# Patient Record
Sex: FEMALE | Race: BLACK OR AFRICAN AMERICAN | Employment: OTHER | URBAN - METROPOLITAN AREA
[De-identification: names, ages, dates, MRNs, and addresses within clinical notes are randomized per-mention and may not be internally consistent; named-entity substitution may affect disease eponyms.]

---

## 2017-03-04 PROBLEM — H40.1190 COAG (CHRONIC OPEN-ANGLE GLAUCOMA): Status: ACTIVE | Noted: 2017-03-04

## 2017-03-04 PROBLEM — E78.5 DYSLIPIDEMIA: Status: ACTIVE | Noted: 2017-03-04

## 2017-06-07 PROBLEM — Z79.01 ANTICOAGULATED: Status: ACTIVE | Noted: 2017-06-07

## 2017-06-07 PROBLEM — I10 ESSENTIAL HYPERTENSION: Status: ACTIVE | Noted: 2017-06-07

## 2018-05-11 PROCEDURE — 84403 ASSAY OF TOTAL TESTOSTERONE: CPT | Performed by: INTERNAL MEDICINE

## 2018-06-15 PROBLEM — Z51.81 MONITORING FOR LONG-TERM ANTICOAGULANT USE: Status: ACTIVE | Noted: 2018-06-15

## 2018-06-15 PROBLEM — Z79.01 MONITORING FOR LONG-TERM ANTICOAGULANT USE: Status: ACTIVE | Noted: 2018-06-15

## 2018-10-26 PROCEDURE — 86780 TREPONEMA PALLIDUM: CPT | Performed by: INTERNAL MEDICINE

## 2018-10-26 PROCEDURE — 81001 URINALYSIS AUTO W/SCOPE: CPT | Performed by: INTERNAL MEDICINE

## 2018-10-26 PROCEDURE — 87086 URINE CULTURE/COLONY COUNT: CPT | Performed by: INTERNAL MEDICINE

## 2019-01-23 PROBLEM — Z79.01 LONG TERM (CURRENT) USE OF ANTICOAGULANTS: Status: ACTIVE | Noted: 2019-01-23

## 2019-02-13 ENCOUNTER — APPOINTMENT (OUTPATIENT)
Dept: CT IMAGING | Facility: HOSPITAL | Age: 84
DRG: 065 | End: 2019-02-13
Attending: EMERGENCY MEDICINE
Payer: COMMERCIAL

## 2019-02-13 ENCOUNTER — HOSPITAL ENCOUNTER (INPATIENT)
Facility: HOSPITAL | Age: 84
LOS: 4 days | Discharge: NURSING, REHAB OR LONG-TERM CARE | DRG: 065 | End: 2019-02-18
Attending: EMERGENCY MEDICINE | Admitting: HOSPITALIST
Payer: COMMERCIAL

## 2019-02-13 ENCOUNTER — APPOINTMENT (OUTPATIENT)
Dept: GENERAL RADIOLOGY | Facility: HOSPITAL | Age: 84
DRG: 065 | End: 2019-02-13
Attending: EMERGENCY MEDICINE
Payer: COMMERCIAL

## 2019-02-13 DIAGNOSIS — I48.20 CHRONIC A-FIB (HCC): ICD-10-CM

## 2019-02-13 DIAGNOSIS — R26.2 INABILITY TO WALK: Primary | ICD-10-CM

## 2019-02-13 DIAGNOSIS — I48.91 ATRIAL FIBRILLATION, UNSPECIFIED TYPE (HCC): ICD-10-CM

## 2019-02-13 DIAGNOSIS — I10 ESSENTIAL HYPERTENSION: ICD-10-CM

## 2019-02-13 DIAGNOSIS — I48.0 PAROXYSMAL ATRIAL FIBRILLATION (HCC): ICD-10-CM

## 2019-02-13 DIAGNOSIS — M79.89 LEG SWELLING: ICD-10-CM

## 2019-02-13 DIAGNOSIS — I10 BENIGN ESSENTIAL HTN: ICD-10-CM

## 2019-02-13 LAB
ANION GAP SERPL CALC-SCNC: 10 MMOL/L (ref 0–18)
BASOPHILS # BLD AUTO: 0.01 X10(3) UL (ref 0–0.2)
BASOPHILS NFR BLD AUTO: 0.2 %
BILIRUB UR QL: NEGATIVE
BUN BLD-MCNC: 20 MG/DL (ref 7–18)
BUN/CREAT SERPL: 21.3 (ref 10–20)
CALCIUM BLD-MCNC: 9.5 MG/DL (ref 8.5–10.1)
CHLORIDE SERPL-SCNC: 106 MMOL/L (ref 98–107)
CLARITY UR: CLEAR
CO2 SERPL-SCNC: 25 MMOL/L (ref 21–32)
COLOR UR: YELLOW
CREAT BLD-MCNC: 0.94 MG/DL (ref 0.55–1.02)
DEPRECATED RDW RBC AUTO: 48.1 FL (ref 35.1–46.3)
EOSINOPHIL # BLD AUTO: 0.01 X10(3) UL (ref 0–0.7)
EOSINOPHIL NFR BLD AUTO: 0.2 %
ERYTHROCYTE [DISTWIDTH] IN BLOOD BY AUTOMATED COUNT: 14.3 % (ref 11–15)
GLUCOSE BLD-MCNC: 147 MG/DL (ref 70–99)
GLUCOSE BLDC GLUCOMTR-MCNC: 154 MG/DL (ref 70–99)
GLUCOSE UR-MCNC: NEGATIVE MG/DL
HCT VFR BLD AUTO: 39.6 % (ref 35–48)
HGB BLD-MCNC: 12.3 G/DL (ref 12–16)
IMM GRANULOCYTES # BLD AUTO: 0.01 X10(3) UL (ref 0–1)
IMM GRANULOCYTES NFR BLD: 0.2 %
INR BLD: 1.7 (ref 0.9–1.2)
KETONES UR-MCNC: 20 MG/DL
LEUKOCYTE ESTERASE UR QL STRIP.AUTO: NEGATIVE
LYMPHOCYTES # BLD AUTO: 1.24 X10(3) UL (ref 1–4)
LYMPHOCYTES NFR BLD AUTO: 20.6 %
MCH RBC QN AUTO: 28.2 PG (ref 26–34)
MCHC RBC AUTO-ENTMCNC: 31.1 G/DL (ref 31–37)
MCV RBC AUTO: 90.8 FL (ref 80–100)
MONOCYTES # BLD AUTO: 0.33 X10(3) UL (ref 0.1–1)
MONOCYTES NFR BLD AUTO: 5.5 %
NEUTROPHILS # BLD AUTO: 4.42 X10 (3) UL (ref 1.5–7.7)
NEUTROPHILS # BLD AUTO: 4.42 X10(3) UL (ref 1.5–7.7)
NEUTROPHILS NFR BLD AUTO: 73.3 %
NITRITE UR QL STRIP.AUTO: NEGATIVE
OSMOLALITY SERPL CALC.SUM OF ELEC: 297 MOSM/KG (ref 275–295)
PH UR: 5 [PH] (ref 5–8)
PLATELET # BLD AUTO: 224 10(3)UL (ref 150–450)
POTASSIUM SERPL-SCNC: 3.4 MMOL/L (ref 3.5–5.1)
PROT UR-MCNC: 30 MG/DL
PROTHROMBIN TIME: 19.4 SECONDS (ref 11.8–14.5)
RBC # BLD AUTO: 4.36 X10(6)UL (ref 3.8–5.3)
RBC #/AREA URNS AUTO: 4 /HPF
SODIUM SERPL-SCNC: 141 MMOL/L (ref 136–145)
SP GR UR STRIP: 1.03 (ref 1–1.03)
UROBILINOGEN UR STRIP-ACNC: 2
VIT C UR-MCNC: NEGATIVE MG/DL
WBC # BLD AUTO: 6 X10(3) UL (ref 4–11)
WBC #/AREA URNS AUTO: <1 /HPF

## 2019-02-13 PROCEDURE — 70450 CT HEAD/BRAIN W/O DYE: CPT | Performed by: EMERGENCY MEDICINE

## 2019-02-13 PROCEDURE — 71045 X-RAY EXAM CHEST 1 VIEW: CPT | Performed by: EMERGENCY MEDICINE

## 2019-02-13 RX ORDER — ACETAMINOPHEN 325 MG/1
650 TABLET ORAL EVERY 6 HOURS PRN
Status: DISCONTINUED | OUTPATIENT
Start: 2019-02-13 | End: 2019-02-18

## 2019-02-13 RX ORDER — DOCUSATE SODIUM 100 MG/1
100 CAPSULE, LIQUID FILLED ORAL 2 TIMES DAILY
Status: DISCONTINUED | OUTPATIENT
Start: 2019-02-14 | End: 2019-02-18

## 2019-02-13 RX ORDER — METOCLOPRAMIDE HYDROCHLORIDE 5 MG/ML
5 INJECTION INTRAMUSCULAR; INTRAVENOUS EVERY 8 HOURS PRN
Status: DISCONTINUED | OUTPATIENT
Start: 2019-02-13 | End: 2019-02-18

## 2019-02-13 RX ORDER — HEPARIN SODIUM 5000 [USP'U]/ML
5000 INJECTION, SOLUTION INTRAVENOUS; SUBCUTANEOUS EVERY 8 HOURS SCHEDULED
Status: DISCONTINUED | OUTPATIENT
Start: 2019-02-14 | End: 2019-02-14

## 2019-02-13 RX ORDER — POLYETHYLENE GLYCOL 3350 17 G/17G
17 POWDER, FOR SOLUTION ORAL DAILY PRN
Status: DISCONTINUED | OUTPATIENT
Start: 2019-02-13 | End: 2019-02-18

## 2019-02-13 RX ORDER — BISACODYL 10 MG
10 SUPPOSITORY, RECTAL RECTAL
Status: DISCONTINUED | OUTPATIENT
Start: 2019-02-13 | End: 2019-02-18

## 2019-02-13 RX ORDER — SODIUM PHOSPHATE, DIBASIC AND SODIUM PHOSPHATE, MONOBASIC 7; 19 G/133ML; G/133ML
1 ENEMA RECTAL ONCE AS NEEDED
Status: DISCONTINUED | OUTPATIENT
Start: 2019-02-13 | End: 2019-02-18

## 2019-02-13 RX ORDER — SODIUM CHLORIDE 9 MG/ML
INJECTION, SOLUTION INTRAVENOUS CONTINUOUS
Status: DISCONTINUED | OUTPATIENT
Start: 2019-02-14 | End: 2019-02-15

## 2019-02-13 RX ORDER — ACETAMINOPHEN 500 MG
1000 TABLET ORAL ONCE
Status: COMPLETED | OUTPATIENT
Start: 2019-02-13 | End: 2019-02-13

## 2019-02-13 RX ORDER — SODIUM CHLORIDE 0.9 % (FLUSH) 0.9 %
3 SYRINGE (ML) INJECTION AS NEEDED
Status: DISCONTINUED | OUTPATIENT
Start: 2019-02-13 | End: 2019-02-18

## 2019-02-13 RX ORDER — POTASSIUM CHLORIDE 20 MEQ/1
40 TABLET, EXTENDED RELEASE ORAL EVERY 4 HOURS
Status: COMPLETED | OUTPATIENT
Start: 2019-02-14 | End: 2019-02-14

## 2019-02-13 RX ORDER — ONDANSETRON 2 MG/ML
4 INJECTION INTRAMUSCULAR; INTRAVENOUS EVERY 6 HOURS PRN
Status: DISCONTINUED | OUTPATIENT
Start: 2019-02-13 | End: 2019-02-18

## 2019-02-14 ENCOUNTER — APPOINTMENT (OUTPATIENT)
Dept: MRI IMAGING | Facility: HOSPITAL | Age: 84
DRG: 065 | End: 2019-02-14
Attending: HOSPITALIST
Payer: COMMERCIAL

## 2019-02-14 ENCOUNTER — APPOINTMENT (OUTPATIENT)
Dept: ULTRASOUND IMAGING | Facility: HOSPITAL | Age: 84
DRG: 065 | End: 2019-02-14
Attending: HOSPITALIST
Payer: COMMERCIAL

## 2019-02-14 PROBLEM — I63.9 CVA (CEREBRAL VASCULAR ACCIDENT) (HCC): Status: ACTIVE | Noted: 2019-02-14

## 2019-02-14 LAB
INR BLD: 1.8 (ref 0.9–1.2)
POTASSIUM SERPL-SCNC: 3.9 MMOL/L (ref 3.5–5.1)
PROTHROMBIN TIME: 20.5 SECONDS (ref 11.8–14.5)

## 2019-02-14 PROCEDURE — 99223 1ST HOSP IP/OBS HIGH 75: CPT | Performed by: OTHER

## 2019-02-14 PROCEDURE — 93880 EXTRACRANIAL BILAT STUDY: CPT | Performed by: HOSPITALIST

## 2019-02-14 RX ORDER — CITALOPRAM 20 MG/1
10 TABLET ORAL DAILY
Status: DISCONTINUED | OUTPATIENT
Start: 2019-02-14 | End: 2019-02-15

## 2019-02-14 RX ORDER — ENOXAPARIN SODIUM 150 MG/ML
1 INJECTION SUBCUTANEOUS EVERY 12 HOURS SCHEDULED
Status: DISCONTINUED | OUTPATIENT
Start: 2019-02-14 | End: 2019-02-16

## 2019-02-14 RX ORDER — NYSTATIN 100000 U/G
OINTMENT TOPICAL 2 TIMES DAILY
Status: DISCONTINUED | OUTPATIENT
Start: 2019-02-14 | End: 2019-02-18

## 2019-02-14 RX ORDER — WARFARIN SODIUM 4 MG/1
4 TABLET ORAL NIGHTLY
Status: DISCONTINUED | OUTPATIENT
Start: 2019-02-14 | End: 2019-02-16

## 2019-02-14 RX ORDER — DILTIAZEM HYDROCHLORIDE 180 MG/1
180 CAPSULE, EXTENDED RELEASE ORAL DAILY
Status: DISCONTINUED | OUTPATIENT
Start: 2019-02-14 | End: 2019-02-18

## 2019-02-14 RX ORDER — LATANOPROST 50 UG/ML
1 SOLUTION/ DROPS OPHTHALMIC NIGHTLY
Status: DISCONTINUED | OUTPATIENT
Start: 2019-02-14 | End: 2019-02-18

## 2019-02-14 NOTE — CONSULTS
Neurology Inpatient Consult Note    Bradley Gudino : 1934   Referring Physician: Dr. Merlyn Montoya  HPI:     Bradley Gudino is a 80year old female who is being seen in neurologic evaluation.     Patient presented to ED left sided weakness, altered me Alcohol use: No        Alcohol/week: 0.0 oz      Drug use: No        ROS:   GENERAL: no fevers, no chills  SKIN: no new lesions or rashes  HEENT: See HPI  RESPIRATORY: no shortness of breath, no wheezing  CARDIOVASCULAR: no chest pain, no palpitations  GI: cavity size is normal. Wall thickness is normal.     Systolic function is at the lower limits of normal. The estimated     ejection fraction is 50-55%. Wall motion is normal; there are no regional     wall motion abnormalities. 3. Mitral valve:  There is m

## 2019-02-14 NOTE — H&P
[facility] Hospitalist H&P     CC: Patient presents with:  Stroke (neurologic)       PCP: Sal Alcaraz MD      Assessment and Plan     Bradley Gudino is a 80year old female with PMH sig for afib with prior CVA, dementia, anxiety, glaucoma, HTN, urge inco CVA, dementia, anxiety, glaucoma, HTN, urge incontinence who presented left facial droop, left leg weakness and confusion. Symptoms improved but then was unable to ambulate on evaluation. On exam also noted some mild expressive aphasia.       She denies a oz       Fam Hx  Family History   Problem Relation Age of Onset   • Cancer Father         prostate   • Other (Other) Mother         TB   • Other (Other) Sister         sickle cell anemia   • Heart Disorder Son            Objective     Temp: 98 °F (36.7 °C) CEREBRUM: No edema, hemorrhage, mass, acute infarction, or significant atrophy. Small chronic lacunar infarct in the left cerebellar hemisphere inferiorly.  WHITE MATTER: Mild chronic appearing deep white matter ischemic change in the periventricular white 19:52

## 2019-02-14 NOTE — PLAN OF CARE
Problem: Patient Centered Care  Goal: Patient preferences are identified and integrated in the patient's plan of care  Interventions:  - What would you like us to know as we care for you?  Pt lives with son, Pleasant Cheadle who works during the day, pt is mostly home intact  INTERVENTIONS  - Assess and document risk factors for pressure ulcer development  - Assess and document skin integrity  - Monitor for areas of redness and/or skin breakdown  - Initiate interventions, skin care algorithm/standards of care as needed

## 2019-02-14 NOTE — PROGRESS NOTES
[facility] Note:  Renal Dose Adjustment for Metoclopramide (REGLAN)    Jordana Lake has been prescribed Metoclopramide (REGLAN) 10 mg every 8 hours as needed for Nausea, vomiting.     Estimated Creatinine Clearance: 37.8 mL/min (based on SCr of 0.94 mg/dL

## 2019-02-14 NOTE — ED NOTES
Orders for admission, patient is aware of plan and ready to go upstairs.  Any questions, please call ED BRENNAN Martínez  at extension [phone] [facility]

## 2019-02-14 NOTE — ED INITIAL ASSESSMENT (HPI)
Patient from home, alone. Family noted left facial droop and slurred speech. Last known well 1330. Patient normally A&Ox4 and ambulates independently.

## 2019-02-14 NOTE — CM/SW NOTE
SW received MDO to [facility] for home needs, social support and ability to obtain medications. SW spoke w/ pt's daughter, Kelli Tsai for initial assessment and to discuss eventual discharge plans. Pt lives at home w/ her son, Trevor Medina in [facility].  Pt's dtr, Ad

## 2019-02-14 NOTE — ED PROVIDER NOTES
Patient Seen in: [facility] AND [facility] Emergency Department    History   Patient presents with:  Stroke (neurologic)    Stated Complaint:     HPI  History is provided by EMS and patient.     19-year-old female with history of hypertension, obesity, here with and frequency. Musculoskeletal: Negative for back pain. Skin: Negative for rash. Neurological: Negative for weakness, light-headedness and headaches. All other systems reviewed and are negative.       Positive for stated complaint:   Other systems a Atrial Fibrillation  Reading: normal for rate, abnormal for rhythm, ST depressions in V5/6      Cardiac Monitor:    Patient placed on the cardiac monitor and a rhythm strip obtained with the indication of weakness.   Monitor shows afib at a rate of 95 bpm. 4. 42 1.50 - 7.70 x10 (3) uL    Neutrophil Absolute 4.42 1.50 - 7.70 x10(3) uL    Lymphocyte Absolute 1.24 1.00 - 4.00 x10(3) uL    Monocyte Absolute 0.33 0.10 - 1.00 x10(3) uL    Eosinophil Absolute 0.01 0.00 - 0.70 x10(3) uL    Basophil Absolute 0.01 0.00 Xr Chest Ap Portable  (cpt=71045)    Result Date: 2/13/2019  CONCLUSION:  1. Mild cardiomegaly and mildly prominent pulmonary vessels, but no brandon pulmonary edema. No acute airspace disease.      Dictated by (CST): Waqar Nevarez MD on 2/13/2019 at 19: term (current) use of anticoagulants [Z79.01]; and Inability to walk on their problem list. to contribute to the complexity of his ED evaluation.       EMERGENCY DEPARTMENT MEDICAL DECISION MAKING:  After obtaining the patient's history, performing the phys

## 2019-02-14 NOTE — OCCUPATIONAL THERAPY NOTE
OCCUPATIONAL THERAPY EVALUATION - INPATIENT     Room Number: 524/524-A  Evaluation Date: 2/14/2019  Type of Evaluation: Initial  Presenting Problem: (inability to walk)    Physician Order: IP Consult to Occupational Therapy  Reason for Therapy: ADL/IADL Dy educated them on PAULIE recommendation and what to expect. Family and patient verbalized understanding. General Observations: Patient requires additional time to respond to 1 step commands, additional time to complete functional transfers and mobility.  Pa Procedure Laterality Date   • CHOLECYSTECTOMY     • COLONOSCOPY  2008   • D & C     • OTHER SURGICAL HISTORY  9/6/13    cysto-Dr. Persaud [facility]       HOME SITUATION  Type of Home: House  Home Layout: One level  Lives With: Son(son gone for days at a time for work) questions, at times questionable responses    Behavioral/Emotional/Social: Pt is pleasant and cooperative     RANGE OF MOTION   Upper extremity ROM is within functional limits     STRENGTH ASSESSMENT  Upper extremity strength is within functional limits Patient will tolerate standing for 2-3 minutes in prep for adls with spv   Comment:    Patient will complete item retrieval with spv  Comment:          Goals  on: 19  Frequency: 3-5x week    Sondra Kawasaki OTR/L  [facility] AND [facility]

## 2019-02-14 NOTE — PHYSICAL THERAPY NOTE
PHYSICAL THERAPY EVALUATION - INPATIENT     Room Number: 524/524-A  Evaluation Date: 2/14/2019  Type of Evaluation: Initial   Physician Order: PT Eval and Treat    Presenting Problem: Inability to ambulate  Reason for Therapy: Mobility Dysfunction and Dis with this level of impairment may benefit from sub-acute rehab to optimize functional outcomes. Patient will benefit from continued IP PT services to address these deficits in preparation for discharge.     DISCHARGE RECOMMENDATIONS  PT Discharge Recomme Level of Fries: Per pt report, she lives at home with her son whom is not home for days at a time since he is a . She has a daughter that comes and checks in on her daily.  She was ambulating in home with cane when needed, able to toilet Impairment: 64.91%   Standardized Score (AM-PAC Scale): 36.74   CMS Modifier (G-Code): CL    FUNCTIONAL ABILITY STATUS  Gait Assessment   Gait Assistance:  Moderate assistance(assist x 2)  Distance (ft): 25ft; 10ft  Assistive Device: Rolling walker  Pattern

## 2019-02-15 ENCOUNTER — APPOINTMENT (OUTPATIENT)
Dept: MRI IMAGING | Facility: HOSPITAL | Age: 84
DRG: 065 | End: 2019-02-15
Attending: HOSPITALIST
Payer: COMMERCIAL

## 2019-02-15 PROCEDURE — 99232 SBSQ HOSP IP/OBS MODERATE 35: CPT | Performed by: OTHER

## 2019-02-15 PROCEDURE — 70551 MRI BRAIN STEM W/O DYE: CPT | Performed by: HOSPITALIST

## 2019-02-15 PROCEDURE — 90792 PSYCH DIAG EVAL W/MED SRVCS: CPT | Performed by: OTHER

## 2019-02-15 RX ORDER — CITALOPRAM 20 MG/1
20 TABLET ORAL NIGHTLY
Status: DISCONTINUED | OUTPATIENT
Start: 2019-02-16 | End: 2019-02-18

## 2019-02-15 NOTE — CM/SW NOTE
Addend 408p:     Pt's dtr requested referrals to 1)[facility], 2)[facility], 3)[facility], 4)[facility].  Referrals sent.     ----------------------------------------------------------------------------  JESSICA pearson w/ pt and pt's daughter, Nicole Smith to follow up on SN

## 2019-02-15 NOTE — PROGRESS NOTES
[facility] Hospitalist Progress Note     CC: Hospital Follow up    PCP: Brannon Thompson MD       Assessment/Plan:     Principal Problem:    Inability to walk  Active Problems:    CVA (cerebral vascular accident) [facility])    Bobbi Reece is a 80year old female 5' 4\" (1.626 m), weight 237 lb 9.6 oz (107.8 kg), SpO2 99 %, not currently breastfeeding.     Temp:  [97.8 °F (36.6 °C)-97.9 °F (36.6 °C)] 97.8 °F (36.6 °C)  Pulse:  [] 88  Resp:  [16-18] 16  BP: (140-164)/(62-81) 140/81      Intake/Output:    Intake 2/13/2019 at 19:00     Approved by (CST): Jose Gurrola MD on 2/13/2019 at 19:04          Mri Brain (cpt=70551)    Result Date: 2/15/2019  CONCLUSION:  1.  Multiple foci of abnormal diffusion restriction in the left and right cerebellar hemispheres compat Topical BID   • docusate sodium  100 mg Oral BID       Normal Saline Flush, acetaminophen, PEG 3350, magnesium hydroxide, bisacodyl, FLEET ENEMA, ondansetron HCl, Metoclopramide HCl

## 2019-02-15 NOTE — PAYOR COMM NOTE
--------------  ADMISSION REVIEW     Payor: Janie Santiago #:  [id number]  Authorization Number: [id number]    Admit date: 2/14/19  Admit time: 36     Admitting Physician: Jose Stroud DO  Attending Physician:  Madalyn Manning MD  Primary Care Physician fever.   HENT: Negative for congestion, ear pain and sore throat. Eyes: Negative for pain, discharge and redness. Respiratory: Negative for cough, shortness of breath and wheezing. Cardiovascular: Negative for chest pain.    Gastrointestinal: Segundo Scruggs normal finger to nose b/l, no facial asymmetry, slowed although not slurred speech  Skin: Skin is warm and dry. No rash noted. She is not diaphoretic. Psychiatric: She has a normal mood and affect. Nursing note and vitals reviewed.     ED Course     EKG 6.0 4.0 - 11.0 x10(3) uL    RBC 4.36 3.80 - 5.30 x10(6)uL    HGB 12.3 12.0 - 16.0 g/dL    HCT 39.6 35.0 - 48.0 %    MCV 90.8 80.0 - 100.0 fL    MCH 28.2 26.0 - 34.0 pg    MCHC 31.1 31.0 - 37.0 g/dL    RDW-SD 48.1 (H) 35.1 - 46.3 fL    RDW 14.3 11.0 - 15.0 a small chronic lacunar infarct in the left cerebellum inferiorly. There is mild chronic appearing deep white matter ischemic change in the periventricular white matter bilaterally.            Xr Chest Ap Portable  (cpt=71045)  Result Date: 2/13/2019  CONCL Coumadin; Monitoring for long-term anticoagulant use; Long term (current) use of anticoagulants [Z79.01]; and Inability to walk on their problem list. to contribute to the complexity of his ED evaluation.     EMERGENCY DEPARTMENT MEDICAL DECISION MAKING:  A instead of BID as HR in 60's  -hold metoprolol 50 XL  -tele  -hold lasix and lisinopril as it appears from med rec not taking recently  -also per RN no clonidine patch on patient so hold for now.    -per chart review taking 2 mg of coumadin except 4 mg two name but says words incorrectly or words that sound close to but are not the word she wants   Neck Supple, no JVD  Pulm: Lungs clear, normal respiratory effort, No wheezing or crackles  CV: Heart with regular rate and rhythm, No murmurs, rubs, gallops  Abd (CeleXA) tab 10 mg     Date Action Dose Route User    2/15/2019 0923 Given 10 mg Oral Oleg Trinh RN      diltiazem (CARDIZEM CD) 24 hr cap 180 mg     Date Action Dose Route User    2/15/2019 0540 Given 180 mg Oral Thais Carroll RN      docusate so

## 2019-02-15 NOTE — PLAN OF CARE
Pt's daughter, Natalio Valle at bedside, helped in answering some of admit questions/history. Her daughter stated that pt is taking care of her own meds at home.

## 2019-02-15 NOTE — PROGRESS NOTES
Neurology Inpatient Follow-up Note      HPI:     Patient being seen in follow-up. Family members at bedside. Patient feeling well without complaints.       Past Medical Hisotory:  Reviewed    Medications:  Reviewed    Allergies:    Shellfish-Derived P* white matter ischemic changes, likely related to long-standing hypertension and/or diabetes. Old lacunar infarct in the posterior-inferior aspect of the left cerebellar hemisphere.     ASSESSMENT/PLAN:   Acute ischemic strokes  Multifocal ischemia most con

## 2019-02-15 NOTE — PLAN OF CARE
Problem: Patient Centered Care  Goal: Patient preferences are identified and integrated in the patient's plan of care  Interventions:  - What would you like us to know as we care for you?  Pt lives with son, Morgan Payne who works during the day, pt is mostly home and/or skin breakdown  - Initiate interventions, skin care algorithm/standards of care as needed  Outcome: Progressing      Problem: SAFETY ADULT - FALL  Goal: Free from fall injury  INTERVENTIONS:  - Assess pt frequently for physical needs  - Identify cog

## 2019-02-15 NOTE — PHYSICAL THERAPY NOTE
PHYSICAL THERAPY TREATMENT NOTE - INPATIENT     Room Number: 524/524-A       Presenting Problem: Inability to ambulate    Problem List  Principal Problem:    Inability to walk  Active Problems:    CVA (cerebral vascular accident) ([facility] [address]      PHYSICAL THERAP level of impairment may benefit from PAULIE. D/c plans pending progress with therapy and further acute management . SW to work with pt and family on optimal d/c plan. See vitals below , pt with elevated BP.      DISCHARGE RECOMMENDATIONS  PT Discharge Scale): 35.33   CMS Modifier (G-Code): CL    FUNCTIONAL ABILITY STATUS  Gait Assessment   Gait Assistance:  Moderate assistance(2 A used for ambulation close chair follow used  second pers)  Distance (ft): 15 ft x 1  pt declined any further ambulation   Ass

## 2019-02-15 NOTE — PLAN OF CARE
Problem: Patient Centered Care  Goal: Patient preferences are identified and integrated in the patient's plan of care  Interventions:  - What would you like us to know as we care for you?  Pt lives with son, Marcos Fermin who works during the day, pt is mostly home for pressure ulcer development  - Assess and document skin integrity  - Monitor for areas of redness and/or skin breakdown  - Initiate interventions, skin care algorithm/standards of care as needed  Outcome: Progressing  incont care, nystatin cream applied ADULT  Goal: Achieves stable or improved neurological status  INTERVENTIONS  - Assess for and report changes in neurological status  - Initiate measures to prevent increased intracranial pressure  - Maintain blood pressure and fluid volume within ordered p

## 2019-02-15 NOTE — SLP NOTE
SPEECH/LANGUAGE/COGNITIVE EVALUATION - INPATIENT    Admission Date: 2/13/2019  Evaluation Date: 02/15/19    Reason for Referral: Stroke protocol    ASSESSMENT & PLAN   ASSESSMENT & IMPRESSION  RN reports pt with increased response time; however, communicat possibility of embolic disease. There is a   tiny focus of diffusion restriction in the posterior aspect the right parietal lobe most likely representing an additional site of acute/recent infarct.   2. There are mild microvascular white matter ischemic ch

## 2019-02-16 LAB
ANION GAP SERPL CALC-SCNC: 7 MMOL/L (ref 0–18)
BASOPHILS # BLD AUTO: 0.02 X10(3) UL (ref 0–0.2)
BASOPHILS NFR BLD AUTO: 0.5 %
BUN BLD-MCNC: 8 MG/DL (ref 7–18)
BUN/CREAT SERPL: 10.8 (ref 10–20)
CALCIUM BLD-MCNC: 9.1 MG/DL (ref 8.5–10.1)
CHLORIDE SERPL-SCNC: 107 MMOL/L (ref 98–107)
CO2 SERPL-SCNC: 28 MMOL/L (ref 21–32)
CREAT BLD-MCNC: 0.74 MG/DL (ref 0.55–1.02)
DEPRECATED RDW RBC AUTO: 47.7 FL (ref 35.1–46.3)
EOSINOPHIL # BLD AUTO: 0.05 X10(3) UL (ref 0–0.7)
EOSINOPHIL NFR BLD AUTO: 1.2 %
ERYTHROCYTE [DISTWIDTH] IN BLOOD BY AUTOMATED COUNT: 14.5 % (ref 11–15)
GLUCOSE BLD-MCNC: 89 MG/DL (ref 70–99)
HCT VFR BLD AUTO: 38 % (ref 35–48)
HGB BLD-MCNC: 12 G/DL (ref 12–16)
IMM GRANULOCYTES # BLD AUTO: 0.01 X10(3) UL (ref 0–1)
IMM GRANULOCYTES NFR BLD: 0.2 %
INR BLD: 2 (ref 0.9–1.2)
LYMPHOCYTES # BLD AUTO: 1.37 X10(3) UL (ref 1–4)
LYMPHOCYTES NFR BLD AUTO: 32.9 %
MCH RBC QN AUTO: 28.4 PG (ref 26–34)
MCHC RBC AUTO-ENTMCNC: 31.6 G/DL (ref 31–37)
MCV RBC AUTO: 90 FL (ref 80–100)
MONOCYTES # BLD AUTO: 0.36 X10(3) UL (ref 0.1–1)
MONOCYTES NFR BLD AUTO: 8.6 %
NEUTROPHILS # BLD AUTO: 2.36 X10 (3) UL (ref 1.5–7.7)
NEUTROPHILS # BLD AUTO: 2.36 X10(3) UL (ref 1.5–7.7)
NEUTROPHILS NFR BLD AUTO: 56.6 %
OSMOLALITY SERPL CALC.SUM OF ELEC: 292 MOSM/KG (ref 275–295)
PLATELET # BLD AUTO: 204 10(3)UL (ref 150–450)
POTASSIUM SERPL-SCNC: 3.8 MMOL/L (ref 3.5–5.1)
PROTHROMBIN TIME: 21.8 SECONDS (ref 11.8–14.5)
RBC # BLD AUTO: 4.22 X10(6)UL (ref 3.8–5.3)
SODIUM SERPL-SCNC: 142 MMOL/L (ref 136–145)
WBC # BLD AUTO: 4.2 X10(3) UL (ref 4–11)

## 2019-02-16 RX ORDER — POTASSIUM CHLORIDE 20 MEQ/1
40 TABLET, EXTENDED RELEASE ORAL ONCE
Status: COMPLETED | OUTPATIENT
Start: 2019-02-16 | End: 2019-02-16

## 2019-02-16 RX ORDER — WARFARIN SODIUM 5 MG/1
5 TABLET ORAL NIGHTLY
Status: DISCONTINUED | OUTPATIENT
Start: 2019-02-16 | End: 2019-02-18

## 2019-02-16 NOTE — PLAN OF CARE
Problem: Patient/Family Goals  Goal: Patient/Family Long Term Goal  Patient's Long Term Goal: To return home    Interventions:  - follow md orders  -monitor labs  -administer meds  - See additional Care Plan goals for specific interventions   Outcome: Prog strengthening/mobility  - Encourage toileting schedule  Outcome: Progressing      Problem: DISCHARGE PLANNING  Goal: Discharge to home or other facility with appropriate resources  INTERVENTIONS:  - Identify barriers to discharge w/pt and caregiver  - Incl

## 2019-02-16 NOTE — CONSULTS
[facility] HOSP - [facility]    Report of Consultation    [place] Gulf Patient Status:  Inpatient    1934 MRN [id number]   Location [facility]5W Attending Marilee Alanis MD   Hosp Day # 1 PCP Rickey Champion MD     Date of Admission:   patient reported that she do her best to be compliant with medication and she name medication by the function such taken blood thinner and taken medicine for blood pressure.     According to the son there is no clarity indicate that the family has been help Alcohol/week: 0.0 oz    Drug use: No          Current Medications:    Current Facility-Administered Medications:  [START ON 2/16/2019] Citalopram Hydrobromide (CeleXA) tab 20 mg 20 mg Oral Nightly   Enoxaparin Sodium (LOVENOX) 120 MG/0.8ML injection 110 mg ERGOCALCIFEROL 66563 units Oral Cap TAKE ONE CAPSULE BY MOUTH ONCE A WEEK       Allergies    Shellfish-Derived P*    SWELLING    Comment:Swelling of the mouth      Review of Systems:     As by Admitting/Attending    Results:     Laboratory Data:  Lab Res Mami Banerjee MD on 2/15/2019 at 12:50     Approved by (CST): John Frank MD on 2/15/2019 at 12:59          [facility] (c[facility]38[facility])    Result Date: 2/14/2019  CONCLUSION:  1.  Bilateral atherosclerotic changes with no hemodynamically signi MRI.  Symptom has been resolved. Otherwise the patient seem had more anxiety aside from her CVA need to be address. At this point, I would recommend the following approach:     1. Focus on education and support.   2. Psychotherapy focusing on insight fozia

## 2019-02-16 NOTE — PHYSICAL THERAPY NOTE
PHYSICAL THERAPY TREATMENT NOTE - INPATIENT     Room Number: 524/524-A       Presenting Problem: Inability to ambulate    Problem List  Principal Problem:    Inability to walk  Active Problems:    CVA (cerebral vascular accident) ([facility] [address]    Generalized anxie mobility; Body mechanics; Endurance; Energy conservation;Patient education; Family education;Strengthening;Balance training;Transfer training    SUBJECTIVE  \"I'm doing better, I can take care of myself\"    OBJECTIVE  Precautions: Bed/chair alarm;Cardiac    W to be met by: 2/28/19  Patient Goal Patient's self-stated goal is: Return to PLOF   Goal #1 Patient is able to demonstrate supine - sit EOB @ level: supervision      Goal #1   Current Status  NT this session   Goal #2 Patient is able to demonstrate transfe

## 2019-02-16 NOTE — OCCUPATIONAL THERAPY NOTE
OCCUPATIONAL THERAPY TREATMENT NOTE - INPATIENT        Room Number: 524/524-A           Presenting Problem: (inability to walk)    Problem List  Principal Problem:    Inability to walk  Active Problems:    CVA (cerebral vascular accident) ([facility]    Anne Padilla RESTRICTION  Weight Bearing Restriction: None                PAIN ASSESSMENT  Ratin           ACTIVITY TOLERANCE  Pulse: 118  Heart Rate Source: Monitor                   O2 SATURATIONS  SPO2 on Room Air at Rest: 100             ACTIVITIES OF DAILY KOKO

## 2019-02-16 NOTE — PROGRESS NOTES
[facility] Hospitalist Progress Note     CC: Hospital Follow up    PCP: Tammy Christensen MD       Assessment/Plan:     Principal Problem:    Inability to walk  Active Problems:    CVA (cerebral vascular accident) [facility])    Generalized anxiety disorder    Nichelle weakness    OBJECTIVE:    Blood pressure 122/53, pulse 102, temperature 98 °F (36.7 °C), temperature source Oral, resp. rate 20, height 5' 4\" (1.626 m), weight 237 lb 9.6 oz (107.8 kg), SpO2 97 %, not currently breastfeeding.     Temp:  [98 °F (36.7 °C)-99 focal infarct or mass. There is a small chronic lacunar infarct in the left cerebellum inferiorly. There is mild chronic appearing deep white matter ischemic change in the periventricular white matter bilaterally.      Dictated by (CST): Jed Santiago MD o Sodium  5 mg Oral Nightly   • Citalopram Hydrobromide  20 mg Oral Nightly   • DilTIAZem HCl ER Coated Beads  180 mg Oral Daily   • latanoprost  1 drop Both Eyes Nightly   • nystatin   Topical BID   • docusate sodium  100 mg Oral BID       Normal Saline Flu

## 2019-02-16 NOTE — PLAN OF CARE
Problem: Patient Centered Care  Goal: Patient preferences are identified and integrated in the patient's plan of care  Interventions:  - What would you like us to know as we care for you?  Pt lives with son, Guera Gleason who works during the day, pt is mostly home and/or skin breakdown  - Initiate interventions, skin care algorithm/standards of care as needed  Outcome: Progressing      Problem: SAFETY ADULT - FALL  Goal: Free from fall injury  INTERVENTIONS:  - Assess pt frequently for physical needs  - Identify cog minimize risk of hemorrhage  - Monitor temperature, glucose, and sodium. Initiate appropriate interventions as ordered  Outcome: Progressing      Comments: Patient is alert and oriented. Periods of confusion at times.  No hallucinations noted during day jay

## 2019-02-17 LAB
INR BLD: 2.1 (ref 0.9–1.2)
POTASSIUM SERPL-SCNC: 4 MMOL/L (ref 3.5–5.1)
PROTHROMBIN TIME: 23.4 SECONDS (ref 11.8–14.5)

## 2019-02-17 PROCEDURE — 99233 SBSQ HOSP IP/OBS HIGH 50: CPT | Performed by: OTHER

## 2019-02-18 VITALS
SYSTOLIC BLOOD PRESSURE: 122 MMHG | TEMPERATURE: 98 F | WEIGHT: 237.63 LBS | HEART RATE: 62 BPM | RESPIRATION RATE: 20 BRPM | OXYGEN SATURATION: 100 % | HEIGHT: 64 IN | DIASTOLIC BLOOD PRESSURE: 58 MMHG | BODY MASS INDEX: 40.57 KG/M2

## 2019-02-18 LAB
INR BLD: 2.1 (ref 0.9–1.2)
PROTHROMBIN TIME: 22.9 SECONDS (ref 11.8–14.5)

## 2019-02-18 RX ORDER — METOPROLOL SUCCINATE 50 MG/1
50 TABLET, EXTENDED RELEASE ORAL DAILY
Status: DISCONTINUED | OUTPATIENT
Start: 2019-02-18 | End: 2019-02-18

## 2019-02-18 RX ORDER — WARFARIN SODIUM 5 MG/1
5 TABLET ORAL NIGHTLY
Qty: 30 TABLET | Refills: 0 | Status: SHIPPED | OUTPATIENT
Start: 2019-02-18 | End: 2019-03-07

## 2019-02-18 RX ORDER — FUROSEMIDE 20 MG/1
20 TABLET ORAL DAILY
Qty: 30 TABLET | Refills: 0 | Status: SHIPPED | OUTPATIENT
Start: 2019-02-19 | End: 2019-03-07

## 2019-02-18 NOTE — PLAN OF CARE
Problem: Patient Centered Care  Goal: Patient preferences are identified and integrated in the patient's plan of care  Interventions:  - What would you like us to know as we care for you?  Pt lives with son, Shawn Casanova who works during the day, pt is mostly home integrity  - Monitor for areas of redness and/or skin breakdown  - Initiate interventions, skin care algorithm/standards of care as needed  Outcome: Adequate for Discharge      Problem: SAFETY ADULT - FALL  Goal: Free from fall injury  INTERVENTIONS:  - As and fluid volume within ordered parameters to optimize cerebral perfusion and minimize risk of hemorrhage  - Monitor temperature, glucose, and sodium.  Initiate appropriate interventions as ordered  Outcome: Adequate for Discharge      Problem: Diabetes/Glu

## 2019-02-18 NOTE — PROGRESS NOTES
Daughter at the bedside, [facility], report called to [email or web address].

## 2019-02-18 NOTE — PLAN OF CARE
Problem: Patient/Family Goals  Goal: Patient/Family Short Term Goal  Patient's Short Term Goal: To be able to walk again    Interventions:   - neuro consult  -pt/ot   -enc bed mobility  -assist pt w/ adl's  -fall prec    - See additional Care Plan goals fo Include patient/family/discharge partner in discharge planning  - Arrange for needed discharge resources and transportation as appropriate  - Identify discharge learning needs (meds, wound care, etc)  - Arrange for interpreters to assist at discharge as ne

## 2019-02-18 NOTE — PHYSICAL THERAPY NOTE
PHYSICAL THERAPY TREATMENT NOTE - INPATIENT     Room Number: 524/524-A       Presenting Problem: Inability to ambulate    Problem List  Principal Problem:    Inability to walk  Active Problems:    CVA (cerebral vascular accident) ([facility] [address]    Generalized anxie -  Dynamic Standing: Poor +    AM-PAC '6-Clicks' INPATIENT SHORT FORM - BASIC MOBILITY  How much difficulty does the patient currently have. ..  -   Turning over in bed (including adjusting bedclothes, sheets and blankets)?: A Little   -   Sitting down on a instructions provided to patient in preparation for discharge.    Goal #5   Current Status  in progress

## 2019-02-18 NOTE — PLAN OF CARE
Problem: Patient/Family Goals  Goal: Patient/Family Long Term Goal  Patient's Long Term Goal: To return home    Interventions:  - follow md orders  -monitor labs  -administer meds  - See additional Care Plan goals for specific interventions   Outcome: Prog strengthening/mobility  - Encourage toileting schedule  Outcome: Progressing

## 2019-02-18 NOTE — PROGRESS NOTES
Patient is a 80year old -American female with history of A. fib, prior CVA, anxiety and hypertension who presented to the hospital with left facial drop and left leg weakness and confusion.      Consult Duration     The patient seen for 35 minutes Microscopic hematuria 7/5/2013   • OBESITY    • Obesity    • TIA (transient ischemic attack)     2006   • Tricuspid regurgitation 10/7/2010   • Venous incompetence 4/25/2014   • Visual impairment     uses glasses at home   • Vitamin D deficiency 4/7/2012 (10 mg total) by mouth daily. WARFARIN SODIUM 2 MG Oral Tab TAKE 2 TABLETS BY MOUTH EVERY DAY   lisinopril 40 MG Oral Tab Take 1 tablet (40 mg total) by mouth daily.    DilTIAZem HCl ER Coated Beads (CARTIA XT) 180 MG Oral Capsule SR 24 Hr TAKE ONE CAPSUL microvascular white matter ischemic changes, likely related to long-standing hypertension and/or diabetes. Old lacunar infarct in the posterior-inferior aspect of the left cerebellar hemisphere.   Dictated by (CST): Steve Nicolas MD on 2/15/2019 at 12:50 Prothrombin Time (PT)      Potassium      Prothrombin Time (PT)      Basic Metabolic Panel (8)      CBC With Differential With Platelet      Prothrombin Time (PT)      Potassium      Prothrombin Time (PT)      Prothrombin Time (PT)      Meds This Visit:  R

## 2019-02-18 NOTE — DISCHARGE SUMMARY
General Medicine Discharge Summary     Patient ID:  Gildardo Olson  80year old  2/11/1934    Admit date: 2/13/2019    Discharge date and time: 2/18/19    Attending Physician: Casper Garcia MD     Consults: IP CONSULT TO SOCIAL WORK  IP CONSULT TO NEUROLOGY  IP droop, left leg weakness and confusion.  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  See below for details.       Left sided weakness->sec directions you see here. Warfarin Sodium 5 MG Tabs  Commonly known as:  COUMADIN  Take as directed. If you are unsure how to take this medication, talk to your nurse or doctor. Original instructions: Take 1 tablet (5 mg total) by mouth nightly.   What as outlined    Thank Berna Jewell M.D.  [facility] Hospitalist  [place]

## 2019-02-18 NOTE — CM/SW NOTE
Zeinab from [facility] informed SW they have received insurance authorization and will be ready for pt today at [address] paged MD regarding update. DCSS notified of discharge. RN stated that pt is able to transport via [address].  JESSICA updated pt's daughter, Ad

## 2019-02-18 NOTE — OCCUPATIONAL THERAPY NOTE
OCCUPATIONAL THERAPY TREATMENT NOTE - INPATIENT        Room Number: 524/524-A           Presenting Problem: (inability to walk)    Problem List  Principal Problem:    Inability to walk  Active Problems:    CVA (cerebral vascular accident) ([facility] [address]    Katya Guerrero ACTIVITIES OF DAILY LIVING ASSESSMENT  AM-PAC ‘6-Clicks’ Inpatient Daily Activity Short Form  How much help from another person does the patient currently need…  -   Putting on and taking off regular lower body clothing?: A Lot  -   Bathing (includin

## 2019-02-19 NOTE — PAYOR COMM NOTE
--------------  DISCHARGE REVIEW    Payor: Bindu Rondon #:  [id number]  Authorization Number: [id number]    Admit date: 2/14/19  Admit time:  1433  Discharge Date: 2/18/2019  5:48 PM     Admitting Physician: Tamar Zhao DO  Attending Physician:  Caitlin Connell confusion. Symptoms improved but then was unable to ambulate on evaluation. On exam also noted some mild expressive aphasia.       She denies any focal weakness or numbness, no marked left facial droop noted.   She answers questions but says the wrong wor diet  Wound Care: as directed  Code Status: Full Code  O2: none    Home Medication Changes:     Med list  CHANGE how you take these medications    furosemide 20 MG Tabs  Commonly known as:  LASIX  Take 1 tablet (20 mg total) by mouth daily.  2 tablets daily PEDIATRICS  Contact information:  [address] [facility] [address] [phone]        DC instructions: Other Discharge Instructions:    Please follow INR closely, goal INR 2-3, increased warfarin to 5mg.  INR 2.1 on DC  Please repe

## 2019-02-19 NOTE — PAYOR COMM NOTE
--------------  CONTINUED STAY REVIEW    Payor: Janie Saenz #:  [id number]  Authorization Number: [id number]    Admit date: 2/14/19  Admit time: 36    Admitting Physician: oJyce Tijerina DO  Attending Physician:  No att. providers found  Primary C XL  -tele  -hold lasix and lisinopril as it appears from med rec not taking recently  -also per RN no clonidine patch on patient so hold for now.    -per chart review taking 2 mg of coumadin except 4 mg two days a week, resume at 4 mg for now.    Dementia training;Patient/Family education;Patient/Family training;Equipment eval/education; Compensatory technique education    2/17/19:  Plan:  -INR 2.1, warfarin increased to 5 mg 2/16 and repeat in AM, titrate as needed, lovenox stopped  Afib with prior CVA, HTN REVIEW/APPROVAL OF INPT ADMISSION    PLEASE FAX ALL INPT DAYS AS AUTHORIZED ALONG W/NRD

## 2019-02-20 ENCOUNTER — SNF ADMIT/H&P (OUTPATIENT)
Dept: INTERNAL MEDICINE CLINIC | Facility: SKILLED NURSING FACILITY | Age: 84
End: 2019-02-20

## 2019-02-20 DIAGNOSIS — I10 ESSENTIAL HYPERTENSION: ICD-10-CM

## 2019-02-20 DIAGNOSIS — I48.20 CHRONIC A-FIB (HCC): ICD-10-CM

## 2019-02-20 DIAGNOSIS — I63.9 CEREBROVASCULAR ACCIDENT (CVA), UNSPECIFIED MECHANISM (HCC): ICD-10-CM

## 2019-02-20 PROCEDURE — 99310 SBSQ NF CARE HIGH MDM 45: CPT | Performed by: NURSE PRACTITIONER

## 2019-02-20 NOTE — PROGRESS NOTES
HPI: Kassandra Carter  Is an 81 yo female with PMH sig for afib with prior CVA, dementia, anxiety, glaucoma, HTN, urge incontinence who presented to [address] [place] [address] with left facial droop, left leg weakness and confusion.  Symptoms improved but then was unable to ambulate SYSTEMS:  Denies pain. Denies sob/cough/cp/palpitations. Reports she is having normal BMs. Afebrile.       PHYSICAL EXAM:  GENERAL HEALTH:  NAD  HEENT: atraumatic/normocephalic, mucous membranes pink and moist, PERRLA, EOMI, sclera anicteric, conjunctiva no

## 2019-02-25 ENCOUNTER — SNF VISIT (OUTPATIENT)
Dept: INTERNAL MEDICINE CLINIC | Facility: SKILLED NURSING FACILITY | Age: 84
End: 2019-02-25

## 2019-02-25 DIAGNOSIS — I10 ESSENTIAL HYPERTENSION: ICD-10-CM

## 2019-02-25 DIAGNOSIS — I63.9 CEREBROVASCULAR ACCIDENT (CVA), UNSPECIFIED MECHANISM (HCC): ICD-10-CM

## 2019-02-25 DIAGNOSIS — I48.20 CHRONIC A-FIB (HCC): ICD-10-CM

## 2019-02-25 PROCEDURE — 99308 SBSQ NF CARE LOW MDM 20: CPT | Performed by: NURSE PRACTITIONER

## 2019-02-25 NOTE — PROGRESS NOTES
HPI: Ashley Figueroa  Is an 79 yo female with PMH sig for afib with prior CVA, dementia, anxiety, glaucoma, HTN, urge incontinence who presented to [facility] with left facial droop, left leg weakness and confusion.  Symptoms improved but then was unable to ambulate irregular  ABDOMEN:  normal active BS+, soft, non distended, nontender   MUSCULOSKELETAL/EXTREMITIES: trace BLE edema, elevates extremities equally x 4  SKIN: warm, dry  NEUROLOGIC: follows commands, speech clear   PSYCHIATRIC: a/ox2 to place and time, not

## 2019-02-27 ENCOUNTER — SNF VISIT (OUTPATIENT)
Dept: INTERNAL MEDICINE CLINIC | Facility: SKILLED NURSING FACILITY | Age: 84
End: 2019-02-27

## 2019-02-27 DIAGNOSIS — I10 ESSENTIAL HYPERTENSION: ICD-10-CM

## 2019-02-27 DIAGNOSIS — I63.9 CEREBROVASCULAR ACCIDENT (CVA), UNSPECIFIED MECHANISM (HCC): ICD-10-CM

## 2019-02-27 DIAGNOSIS — R53.1 WEAKNESS: ICD-10-CM

## 2019-02-27 PROCEDURE — 99307 SBSQ NF CARE SF MDM 10: CPT | Performed by: NURSE PRACTITIONER

## 2019-02-27 NOTE — PROGRESS NOTES
HPI: Jordana Forte  Is an 81 yo female with PMH sig for afib with prior CVA, dementia, anxiety, glaucoma, HTN, urge incontinence who presented to [facility] with left facial droop, left leg weakness and confusion.  Symptoms improved but then was unable to ambulate RESPIRATORY: CTAB  CARDIOVASCULAR: S1 S2, rate regular, rhythm irregular  ABDOMEN:  normal active BS+, soft, non distended, nontender   MUSCULOSKELETAL/EXTREMITIES: trace BLE edema, elevates extremities equally x 4  SKIN: warm, dry  NEUROLOGIC: follows c

## 2019-04-11 PROCEDURE — 87086 URINE CULTURE/COLONY COUNT: CPT | Performed by: PHYSICIAN ASSISTANT

## 2019-10-24 PROBLEM — I48.19 PERSISTENT ATRIAL FIBRILLATION (HCC): Status: ACTIVE | Noted: 2019-10-24

## 2020-08-25 PROBLEM — I27.21 SECONDARY PULMONARY ARTERIAL HYPERTENSION (HCC): Status: ACTIVE | Noted: 2020-08-25

## 2020-08-25 PROBLEM — Z86.73 HISTORY OF CVA (CEREBROVASCULAR ACCIDENT): Status: ACTIVE | Noted: 2019-02-14

## 2020-08-25 PROBLEM — I70.0 AORTIC ATHEROSCLEROSIS (HCC): Status: ACTIVE | Noted: 2020-08-25

## 2020-08-25 PROBLEM — D68.69 SECONDARY HYPERCOAGULABLE STATE (HCC): Status: ACTIVE | Noted: 2020-08-25

## 2020-09-04 PROBLEM — I48.21 PERMANENT ATRIAL FIBRILLATION (HCC): Status: ACTIVE | Noted: 2020-09-04

## 2022-12-03 ENCOUNTER — APPOINTMENT (OUTPATIENT)
Dept: CT IMAGING | Facility: HOSPITAL | Age: 87
End: 2022-12-03
Payer: COMMERCIAL

## 2022-12-03 ENCOUNTER — HOSPITAL ENCOUNTER (EMERGENCY)
Facility: HOSPITAL | Age: 87
Discharge: HOME | End: 2022-12-03
Attending: EMERGENCY MEDICINE
Payer: COMMERCIAL

## 2022-12-03 VITALS
TEMPERATURE: 98 F | RESPIRATION RATE: 21 BRPM | DIASTOLIC BLOOD PRESSURE: 62 MMHG | OXYGEN SATURATION: 100 % | HEART RATE: 47 BPM | HEIGHT: 64 IN | BODY MASS INDEX: 37.05 KG/M2 | WEIGHT: 217 LBS | SYSTOLIC BLOOD PRESSURE: 131 MMHG

## 2022-12-03 DIAGNOSIS — S09.90XA INJURY OF HEAD, INITIAL ENCOUNTER: Primary | ICD-10-CM

## 2022-12-03 PROCEDURE — 99284 EMERGENCY DEPT VISIT MOD MDM: CPT

## 2022-12-03 PROCEDURE — 70450 CT HEAD/BRAIN W/O DYE: CPT | Performed by: EMERGENCY MEDICINE

## 2022-12-03 NOTE — ED INITIAL ASSESSMENT (HPI)
Pt to ED via EMS after a mechanical fall from home. Pt reports that she was walking and fell from standing. Pt states she hit her forehead on the corner of a side table. No LOC. On Coumadin. Denies Dizziness or pain.

## 2022-12-03 NOTE — ED QUICK NOTES
Patient safe to DC home per MD. Luanne Jeronimo to dress self. DC teaching done, instructions reviewed with patient including when and how to follow up with healthcare providers and when to seek emergency care. The patient verbalizes understanding. Patient wheeled to exit.

## 2022-12-29 ENCOUNTER — APPOINTMENT (OUTPATIENT)
Dept: CT IMAGING | Facility: HOSPITAL | Age: 87
End: 2022-12-29
Attending: EMERGENCY MEDICINE
Payer: COMMERCIAL

## 2022-12-29 ENCOUNTER — HOSPITAL ENCOUNTER (OUTPATIENT)
Facility: HOSPITAL | Age: 87
Setting detail: OBSERVATION
Discharge: NURSING, REHAB OR LONG-TERM CARE | End: 2023-01-03
Attending: EMERGENCY MEDICINE | Admitting: INTERNAL MEDICINE
Payer: COMMERCIAL

## 2022-12-29 ENCOUNTER — APPOINTMENT (OUTPATIENT)
Dept: MRI IMAGING | Facility: HOSPITAL | Age: 87
End: 2022-12-29
Attending: EMERGENCY MEDICINE
Payer: COMMERCIAL

## 2022-12-29 DIAGNOSIS — S12.01XA CLOSED JEFFERSON FRACTURE, INITIAL ENCOUNTER (HCC): Primary | ICD-10-CM

## 2022-12-29 LAB
ANION GAP SERPL CALC-SCNC: 9 MMOL/L (ref 0–18)
BASOPHILS # BLD AUTO: 0.01 X10(3) UL (ref 0–0.2)
BASOPHILS NFR BLD AUTO: 0.2 %
BUN BLD-MCNC: 13 MG/DL (ref 7–18)
BUN/CREAT SERPL: 13.3 (ref 10–20)
CALCIUM BLD-MCNC: 9.2 MG/DL (ref 8.5–10.1)
CHLORIDE SERPL-SCNC: 106 MMOL/L (ref 98–112)
CO2 SERPL-SCNC: 26 MMOL/L (ref 21–32)
CREAT BLD-MCNC: 0.98 MG/DL
DEPRECATED RDW RBC AUTO: 50.6 FL (ref 35.1–46.3)
EOSINOPHIL # BLD AUTO: 0.05 X10(3) UL (ref 0–0.7)
EOSINOPHIL NFR BLD AUTO: 0.8 %
ERYTHROCYTE [DISTWIDTH] IN BLOOD BY AUTOMATED COUNT: 14.6 % (ref 11–15)
GFR SERPLBLD BASED ON 1.73 SQ M-ARVRAT: 56 ML/MIN/1.73M2 (ref 60–?)
GLUCOSE BLD-MCNC: 95 MG/DL (ref 70–99)
HCT VFR BLD AUTO: 33.4 %
HGB BLD-MCNC: 10.1 G/DL
IMM GRANULOCYTES # BLD AUTO: 0.01 X10(3) UL (ref 0–1)
IMM GRANULOCYTES NFR BLD: 0.2 %
INR BLD: 3.28 (ref 0.85–1.16)
LYMPHOCYTES # BLD AUTO: 0.88 X10(3) UL (ref 1–4)
LYMPHOCYTES NFR BLD AUTO: 13.9 %
MCH RBC QN AUTO: 28.3 PG (ref 26–34)
MCHC RBC AUTO-ENTMCNC: 30.2 G/DL (ref 31–37)
MCV RBC AUTO: 93.6 FL
MONOCYTES # BLD AUTO: 0.71 X10(3) UL (ref 0.1–1)
MONOCYTES NFR BLD AUTO: 11.2 %
NEUTROPHILS # BLD AUTO: 4.69 X10 (3) UL (ref 1.5–7.7)
NEUTROPHILS # BLD AUTO: 4.69 X10(3) UL (ref 1.5–7.7)
NEUTROPHILS NFR BLD AUTO: 73.7 %
OSMOLALITY SERPL CALC.SUM OF ELEC: 292 MOSM/KG (ref 275–295)
PLATELET # BLD AUTO: 206 10(3)UL (ref 150–450)
POTASSIUM SERPL-SCNC: 4.1 MMOL/L (ref 3.5–5.1)
PROTHROMBIN TIME: 32.7 SECONDS (ref 11.6–14.8)
RBC # BLD AUTO: 3.57 X10(6)UL
SARS-COV-2 RNA RESP QL NAA+PROBE: NOT DETECTED
SODIUM SERPL-SCNC: 141 MMOL/L (ref 136–145)
WBC # BLD AUTO: 6.4 X10(3) UL (ref 4–11)

## 2022-12-29 PROCEDURE — 99203 OFFICE O/P NEW LOW 30 MIN: CPT | Performed by: STUDENT IN AN ORGANIZED HEALTH CARE EDUCATION/TRAINING PROGRAM

## 2022-12-29 PROCEDURE — 72141 MRI NECK SPINE W/O DYE: CPT | Performed by: EMERGENCY MEDICINE

## 2022-12-29 PROCEDURE — 72125 CT NECK SPINE W/O DYE: CPT | Performed by: EMERGENCY MEDICINE

## 2022-12-29 PROCEDURE — 70450 CT HEAD/BRAIN W/O DYE: CPT | Performed by: EMERGENCY MEDICINE

## 2022-12-29 RX ORDER — HYDROCODONE BITARTRATE AND ACETAMINOPHEN 5; 325 MG/1; MG/1
1 TABLET ORAL EVERY 4 HOURS PRN
Status: DISCONTINUED | OUTPATIENT
Start: 2022-12-29 | End: 2023-01-03

## 2022-12-29 RX ORDER — DILTIAZEM HYDROCHLORIDE 180 MG/1
180 CAPSULE, EXTENDED RELEASE ORAL DAILY
Status: DISCONTINUED | OUTPATIENT
Start: 2022-12-30 | End: 2023-01-03

## 2022-12-29 RX ORDER — ACETAMINOPHEN 325 MG/1
650 TABLET ORAL EVERY 4 HOURS PRN
Status: DISCONTINUED | OUTPATIENT
Start: 2022-12-29 | End: 2023-01-03

## 2022-12-29 RX ORDER — ALPRAZOLAM 0.5 MG/1
0.25 TABLET ORAL EVERY 8 HOURS PRN
Status: DISCONTINUED | OUTPATIENT
Start: 2022-12-29 | End: 2023-01-03

## 2022-12-29 RX ORDER — LISINOPRIL 40 MG/1
40 TABLET ORAL DAILY
Status: DISCONTINUED | OUTPATIENT
Start: 2022-12-30 | End: 2023-01-03

## 2022-12-29 RX ORDER — METOPROLOL SUCCINATE 50 MG/1
50 TABLET, EXTENDED RELEASE ORAL
Status: DISCONTINUED | OUTPATIENT
Start: 2022-12-30 | End: 2023-01-03

## 2022-12-29 RX ORDER — ONDANSETRON 2 MG/ML
4 INJECTION INTRAMUSCULAR; INTRAVENOUS EVERY 6 HOURS PRN
Status: DISCONTINUED | OUTPATIENT
Start: 2022-12-29 | End: 2023-01-03

## 2022-12-29 RX ORDER — LATANOPROST 50 UG/ML
1 SOLUTION/ DROPS OPHTHALMIC NIGHTLY
Status: DISCONTINUED | OUTPATIENT
Start: 2022-12-29 | End: 2023-01-03

## 2022-12-29 RX ORDER — HYDROCODONE BITARTRATE AND ACETAMINOPHEN 5; 325 MG/1; MG/1
2 TABLET ORAL EVERY 4 HOURS PRN
Status: DISCONTINUED | OUTPATIENT
Start: 2022-12-29 | End: 2023-01-03

## 2022-12-29 NOTE — DISCHARGE INSTRUCTIONS
[place] Cervical Collar Neck Brace can be purchased at:  [facility]   # [phone]    [facility]   [address]

## 2022-12-29 NOTE — ED INITIAL ASSESSMENT (HPI)
Pt to ED via EMS from home c/o headache starting this morning. Pt reports a fall 2 days ago, +head injury to left side of head.

## 2022-12-29 NOTE — CM/SW NOTE
CM added in AVS the 3 resources for pt to obtain a [place] Cervical collar. CM to remain available for support and/or discharge planning.     Ruslan Iglesias RN, [address] Nurse   Ext. [phone]

## 2022-12-30 LAB
ANION GAP SERPL CALC-SCNC: 9 MMOL/L (ref 0–18)
BASOPHILS # BLD AUTO: 0.02 X10(3) UL (ref 0–0.2)
BASOPHILS NFR BLD AUTO: 0.4 %
BUN BLD-MCNC: 11 MG/DL (ref 7–18)
BUN/CREAT SERPL: 12.2 (ref 10–20)
CALCIUM BLD-MCNC: 9 MG/DL (ref 8.5–10.1)
CHLORIDE SERPL-SCNC: 105 MMOL/L (ref 98–112)
CO2 SERPL-SCNC: 27 MMOL/L (ref 21–32)
CREAT BLD-MCNC: 0.9 MG/DL
DEPRECATED RDW RBC AUTO: 48 FL (ref 35.1–46.3)
EOSINOPHIL # BLD AUTO: 0.02 X10(3) UL (ref 0–0.7)
EOSINOPHIL NFR BLD AUTO: 0.4 %
ERYTHROCYTE [DISTWIDTH] IN BLOOD BY AUTOMATED COUNT: 14.2 % (ref 11–15)
GFR SERPLBLD BASED ON 1.73 SQ M-ARVRAT: 61 ML/MIN/1.73M2 (ref 60–?)
GLUCOSE BLD-MCNC: 77 MG/DL (ref 70–99)
HCT VFR BLD AUTO: 32 %
HGB BLD-MCNC: 9.9 G/DL
IMM GRANULOCYTES # BLD AUTO: 0.02 X10(3) UL (ref 0–1)
IMM GRANULOCYTES NFR BLD: 0.4 %
INR BLD: 3.35 (ref 0.85–1.16)
LYMPHOCYTES # BLD AUTO: 0.78 X10(3) UL (ref 1–4)
LYMPHOCYTES NFR BLD AUTO: 14.7 %
MAGNESIUM SERPL-MCNC: 1.9 MG/DL (ref 1.6–2.6)
MCH RBC QN AUTO: 28.6 PG (ref 26–34)
MCHC RBC AUTO-ENTMCNC: 30.9 G/DL (ref 31–37)
MCV RBC AUTO: 92.5 FL
MONOCYTES # BLD AUTO: 0.65 X10(3) UL (ref 0.1–1)
MONOCYTES NFR BLD AUTO: 12.2 %
NEUTROPHILS # BLD AUTO: 3.82 X10 (3) UL (ref 1.5–7.7)
NEUTROPHILS # BLD AUTO: 3.82 X10(3) UL (ref 1.5–7.7)
NEUTROPHILS NFR BLD AUTO: 71.9 %
OSMOLALITY SERPL CALC.SUM OF ELEC: 290 MOSM/KG (ref 275–295)
PLATELET # BLD AUTO: 172 10(3)UL (ref 150–450)
POTASSIUM SERPL-SCNC: 3.7 MMOL/L (ref 3.5–5.1)
PROTHROMBIN TIME: 33.2 SECONDS (ref 11.6–14.8)
RBC # BLD AUTO: 3.46 X10(6)UL
SODIUM SERPL-SCNC: 141 MMOL/L (ref 136–145)
WBC # BLD AUTO: 5.3 X10(3) UL (ref 4–11)

## 2022-12-30 RX ORDER — ESCITALOPRAM OXALATE 10 MG/1
10 TABLET ORAL DAILY
Status: DISCONTINUED | OUTPATIENT
Start: 2022-12-30 | End: 2023-01-03

## 2022-12-30 NOTE — ED PROVIDER NOTES
Signed out by. Shift follow-up results of MRI. MRI does not show any ligamentous injury. Neurosurgery states patient can safely be discharged with [place] collar and [facility] collar. Patient and family comfortable discharge plan. CT BRAIN OR HEAD (88251)    Addendum Date: 12/29/2022    ADDENDUM:  There is a Freddie fracture of C1, better characterized on cervical spine CT. There is an old left lamina for appreciate fracture. Dictated by (CST): Vishal Maciel MD on 12/29/2022 at 12:59 PM     Finalized by (CST): Vishal Maciel MD on 12/29/2022 at 1:02 PM             Result Date: 12/29/2022  CONCLUSION: Scalp hematoma. No acute intracranial process. Dictated by (CST): Vishal Maciel MD on 12/29/2022 at 12:40 PM     Finalized by (CST): Vishal Maciel MD on 12/29/2022 at 12:44 PM          CT SPINE CERVICAL (CPT=72125)    Result Date: 12/29/2022  CONCLUSION:   1. Freddie fracture. This includes mildly displaced fracture of the right anterior arch of C1. Nondisplaced fracture of the left anterior arch of C1. Minimally displaced posterior arch of C1 fracture. 2. Mild prevertebral soft tissue swelling. 3. Recommend MRI for further assessment of ligamentous injury and any other potential occult cervical spine fractures. .     Dictated by (CST): Vishal Maciel MD on 12/29/2022 at 12:47 PM     Finalized by (CST): Vishal Maciel MD on 12/29/2022 at 12:58 PM          MRI SPINE CERVICAL (CPT=72141)    Result Date: 12/29/2022  CONCLUSION:  1. Nondisplaced acute fractures anterior/ posterior arches of C1 better visualized on CT imaging. 2. Advanced multilevel cervical spondylosis. Chronic retrolisthesis C3 relative to C2 and C4. 3. 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 Central spinal canal measuring 5.4 mm and 5.2 mm respectively at C2-3 and C3-4.   Subtle patient motion limits evaluation for intrinsic cord signal abnormality. Dictated by (CST): Sven Muñoz MD on 12/29/2022 at 3:15 PM     Finalized by (CST): Sven Muñoz MD on 12/29/2022 at 3:32 PM            8:19 PM  Addendum    Neurosurgery saw patient at bedside and felt she is significant fall risk. Patient has a fair amount of critical central stenosis in the cervical spine and they are concerned that patient could reinjure the neck and caused paralysis. Patient initially did not want to stay in the hospital or go to acute rehab but finally has reconsidered. Case management was not able to get nursing home placement for rehab. Will admit observation at this time. Discussed with [facility] hospitalist who is excepted patient for admission. Critical care time exclusive of procedure time spent on this patient was 31 min for taking history from patient examining patient, medical decision-making, reviewing lab work and radiology studies, explaining results to patient and family, discussing with consultants/admitting physician, and documenting in patient's chart.

## 2022-12-30 NOTE — CM/SW NOTE
Received a call from [facility] re a safe dc plan. Pt and family expressed wanting pt to go to a [facility]/SNF post dc. EDCM currently at EDW, and assestment done thru the phone. PASSR done. AIDIN referral placed. Pt will need [facility]. Currently writer unfortunately unable to access [place]. Pt's 1st preference is [facility]. Pt's daughter/ Nubia([phone]) requesting if the  can give her a call re the places that accepted the pt for [facility]. Will endorse to incoming [facility] in the morning.

## 2022-12-30 NOTE — CM/SW NOTE
12/30/22 1600   CM/ Referral Data   Referral Source Physician;Nurse   Reason for Referral Discharge planning   Informant Patient;Daughter   Pertinent Medical Hx   Does patient have an established PCP? Yes  (Luciano Patterson)   Significant Past Medical/Mental Health Hx Fall, cervical fx   Patient Info   Patient's Current Mental Status at Time of Assessment Alert;Oriented   Patient's Home Environment House   Patient lives with Alone  (son lives in apartment above her.)   Patient Status Prior to Admission   Independent with ADLs and Mobility No   Pt. requires assistance with Housework;Driving   Discharge Needs   Anticipated D/C needs Subacute rehab   Services Requested   PASRR Level 1 Submitted Yes   Choice of Post-Acute Provider   Informed patient of right to choose their preferred provider Yes   List of appropriate post-acute services provided to patient/family with quality data No - Declined list   Patient/family choice [facility]       CM received MDO for dc planning. Met with pt at bedside. Pt states that she lives at home alone. Her son lives in the apartment above her. Pt is independent with ambulation, has a walker and cane but does not always use it. Pt state she has hx with rehab ins the past post her stroke but does not recall the name of the facility. Pt agreed to this CM calling her daughter to discuss dc plan. Spoke with Tra Whittaker, she is unable to provide 24hr care for her mom and requests pt to go to rehab. Pt has hx at [facility], dtr would like her to go there on dc. [facility] res via [facility]. PASRR done and attached to referral.   Valerie Ndiaye at [facility] aware to begin insurance auth. Plan: [facility] pending ins auth. / to remain available for support and/or discharge planning. Na Lees.  Yariel Garcia RN, BSN  Nurse   [phone]

## 2022-12-30 NOTE — ED QUICK NOTES
Orders for admission, patient is aware of plan and ready to go upstairs. Any questions, please call ED RN kelsi at extension [phone].      Patient Covid vaccination status: Fully vaccinated     COVID Test Ordered in ED: Rapid SARS-CoV-2 by PCR    COVID Suspicion at Admission: Low clinical suspicion for COVID    Running Infusions:  None    Mental Status/LOC at time of transport: alert    Other pertinent information:   CIWA score: N/A   NIH score:  N/A

## 2022-12-30 NOTE — ED QUICK NOTES
Informed per patient's daughter and son  That she would like to be admitted and referred to [facility].

## 2022-12-30 NOTE — PLAN OF CARE
Patient alert and orientated x3 with forgetfulness. Patient has aspen brace on when out of bed. VSS. No acute changes noted throughout shift. Saline locked. Tolerating general diet. Voiding via purewick. Patient is on room air SCDs on for DVT prophylaxis. PRN Tylenol given for pain medication. Up x2 assist and a walker. Encouraged frequent ambulation and use of incentive spirometer. Fall precautions maintained- bed alarm on, bed locked in lowest position, call light and personal belongings within reach, non-skid socks in place to bilateral feet. Frequent rounding by nursing staff. Plan PT recommends [facility]  Referrals sent by .          Problem: Patient Centered Care  Goal: Patient preferences are identified and integrated in the patient's plan of care  Description: Interventions:  - What would you like us to know as we care for you?   - Provide timely, complete, and accurate information to patient/family  - Incorporate patient and family knowledge, values, beliefs, and cultural backgrounds into the planning and delivery of care  - Encourage patient/family to participate in care and decision-making at the level they choose  - Honor patient and family perspectives and choices  Outcome: Progressing     Problem: Patient/Family Goals  Goal: Patient/Family Long Term Goal  Description: Patient's Long Term Goal:     Interventions:  - See additional Care Plan goals for specific interventions  Outcome: Progressing  Goal: Patient/Family Short Term Goal  Description: Patient's Short Term Goal:     Interventions:  - See additional Care Plan goals for specific interventions  Outcome: Progressing     Problem: PAIN - ADULT  Goal: Verbalizes/displays adequate comfort level or patient's stated pain goal  Description: INTERVENTIONS:  - Encourage pt to monitor pain and request assistance  - Assess pain using appropriate pain scale  - Administer analgesics based on type and severity of pain and evaluate response  - Implement non-pharmacological measures as appropriate and evaluate response  - Consider cultural and social influences on pain and pain management  - Manage/alleviate anxiety  - Utilize distraction and/or relaxation techniques  - Monitor for opioid side effects  - Notify MD/LIP if interventions unsuccessful or patient reports new pain  - Anticipate increased pain with activity and pre-medicate as appropriate  Outcome: Progressing     Problem: MUSCULOSKELETAL - ADULT  Goal: Return mobility to safest level of function  Description: INTERVENTIONS:  - Assess patient stability and activity tolerance for standing, transferring and ambulating w/ or w/o assistive devices  - Assist with transfers and ambulation using safe patient handling equipment as needed  - Ensure adequate protection for wounds/incisions during mobilization  - Obtain PT/OT consults as needed  - Advance activity as appropriate  - Communicate ordered activity level and limitations with patient/family  Outcome: Progressing  Goal: Maintain proper alignment of affected body part  Description: INTERVENTIONS:  - Support and protect limb and body alignment per provider's orders  - Instruct and reinforce with patient and family use of appropriate assistive device and precautions (e.g. spinal or hip dislocation precautions)  Outcome: Progressing

## 2022-12-30 NOTE — PLAN OF CARE
Pt alert & oriented x4, resting in bed. No acute changes noted overnight. No acute changes noted at this time. Aspen collar in place. Voiding via purewick. Denies pain. Rolling side to side. Skin intact. SCDs for DVT prophylaxis. Plan for discharge to [facility]/SNF. Fall precautions maintained. Call light within reach. Bed locked, in lowest position, I-bed active. Problem: Patient Centered Care  Goal: Patient preferences are identified and integrated in the patient's plan of care  Description: Interventions:  - What would you like us to know as we care for you? \"I live at home with my son, Gali Herrera. \"  - Provide timely, complete, and accurate information to patient/family  - Incorporate patient and family knowledge, values, beliefs, and cultural backgrounds into the planning and delivery of care  - Encourage patient/family to participate in care and decision-making at the level they choose  - Honor patient and family perspectives and choices  Outcome: Progressing     Problem: PAIN - ADULT  Goal: Verbalizes/displays adequate comfort level or patient's stated pain goal  Description: INTERVENTIONS:  - Encourage pt to monitor pain and request assistance  - Assess pain using appropriate pain scale  - Administer analgesics based on type and severity of pain and evaluate response  - Implement non-pharmacological measures as appropriate and evaluate response  - Consider cultural and social influences on pain and pain management  - Manage/alleviate anxiety  - Utilize distraction and/or relaxation techniques  - Monitor for opioid side effects  - Notify MD/LIP if interventions unsuccessful or patient reports new pain  - Anticipate increased pain with activity and pre-medicate as appropriate  Outcome: Progressing     Problem: MUSCULOSKELETAL - ADULT  Goal: Return mobility to safest level of function  Description: INTERVENTIONS:  - Assess patient stability and activity tolerance for standing, transferring and ambulating w/ or w/o assistive devices  - Assist with transfers and ambulation using safe patient handling equipment as needed  - Ensure adequate protection for wounds/incisions during mobilization  - Obtain PT/OT consults as needed  - Advance activity as appropriate  - Communicate ordered activity level and limitations with patient/family  Outcome: Progressing  Goal: Maintain proper alignment of affected body part  Description: INTERVENTIONS:  - Support and protect limb and body alignment per provider's orders  - Instruct and reinforce with patient and family use of appropriate assistive device and precautions (e.g. spinal or hip dislocation precautions)  Outcome: Progressing

## 2022-12-30 NOTE — CM/SW NOTE
Department  notified of request for joe [facility] referrals started. Assigned CM/SW to follow up with pt/family on further discharge planning.

## 2022-12-30 NOTE — ED QUICK NOTES
Waiting for a bed. No change in neuro status. No C/O  Pain or paraesthesias. Moving all extremities.

## 2022-12-31 LAB
BASOPHILS # BLD AUTO: 0.01 X10(3) UL (ref 0–0.2)
BASOPHILS NFR BLD AUTO: 0.3 %
DEPRECATED RDW RBC AUTO: 47.5 FL (ref 35.1–46.3)
EOSINOPHIL # BLD AUTO: 0.05 X10(3) UL (ref 0–0.7)
EOSINOPHIL NFR BLD AUTO: 1.3 %
ERYTHROCYTE [DISTWIDTH] IN BLOOD BY AUTOMATED COUNT: 14.2 % (ref 11–15)
HCT VFR BLD AUTO: 30.7 %
HGB BLD-MCNC: 9.7 G/DL
IMM GRANULOCYTES # BLD AUTO: 0.02 X10(3) UL (ref 0–1)
IMM GRANULOCYTES NFR BLD: 0.5 %
INR BLD: 3.23 (ref 0.85–1.16)
LYMPHOCYTES # BLD AUTO: 0.63 X10(3) UL (ref 1–4)
LYMPHOCYTES NFR BLD AUTO: 16.9 %
MCH RBC QN AUTO: 28.6 PG (ref 26–34)
MCHC RBC AUTO-ENTMCNC: 31.6 G/DL (ref 31–37)
MCV RBC AUTO: 90.6 FL
MONOCYTES # BLD AUTO: 0.65 X10(3) UL (ref 0.1–1)
MONOCYTES NFR BLD AUTO: 17.5 %
NEUTROPHILS # BLD AUTO: 2.36 X10 (3) UL (ref 1.5–7.7)
NEUTROPHILS # BLD AUTO: 2.36 X10(3) UL (ref 1.5–7.7)
NEUTROPHILS NFR BLD AUTO: 63.5 %
PLATELET # BLD AUTO: 172 10(3)UL (ref 150–450)
PROTHROMBIN TIME: 32.3 SECONDS (ref 11.6–14.8)
RBC # BLD AUTO: 3.39 X10(6)UL
WBC # BLD AUTO: 3.7 X10(3) UL (ref 4–11)

## 2022-12-31 NOTE — PLAN OF CARE
Patient is alert and oriented 3-4, can be forgetful. Purewick in place. Took medication whole with small sips of water. No pain medications requested by patients, denies pain. Call light within reach. Frequent rounding done.      Problem: Patient Centered Care  Goal: Patient preferences are identified and integrated in the patient's plan of care  Description: Interventions:  - What would you like us to know as we care for you?   - Provide timely, complete, and accurate information to patient/family  - Incorporate patient and family knowledge, values, beliefs, and cultural backgrounds into the planning and delivery of care  - Encourage patient/family to participate in care and decision-making at the level they choose  - Honor patient and family perspectives and choices  Outcome: Progressing     Problem: MUSCULOSKELETAL - ADULT  Goal: Return mobility to safest level of function  Description: INTERVENTIONS:  - Assess patient stability and activity tolerance for standing, transferring and ambulating w/ or w/o assistive devices  - Assist with transfers and ambulation using safe patient handling equipment as needed  - Ensure adequate protection for wounds/incisions during mobilization  - Obtain PT/OT consults as needed  - Advance activity as appropriate  - Communicate ordered activity level and limitations with patient/family  Outcome: Progressing

## 2022-12-31 NOTE — CM/SW NOTE
Requested update on ins auth from [facility], awaiting review and response.      INGE Vaughan, [facility]    [id number]

## 2023-01-01 LAB
INR BLD: 2.38 (ref 0.85–1.16)
PROTHROMBIN TIME: 25.5 SECONDS (ref 11.6–14.8)

## 2023-01-01 RX ORDER — POLYETHYLENE GLYCOL 3350 17 G/17G
17 POWDER, FOR SOLUTION ORAL DAILY PRN
Status: DISCONTINUED | OUTPATIENT
Start: 2023-01-01 | End: 2023-01-03

## 2023-01-01 RX ORDER — FUROSEMIDE 20 MG/1
20 TABLET ORAL DAILY
Status: DISCONTINUED | OUTPATIENT
Start: 2023-01-01 | End: 2023-01-03

## 2023-01-01 RX ORDER — SENNA AND DOCUSATE SODIUM 50; 8.6 MG/1; MG/1
2 TABLET, FILM COATED ORAL 2 TIMES DAILY
Status: DISCONTINUED | OUTPATIENT
Start: 2023-01-01 | End: 2023-01-03

## 2023-01-01 RX ORDER — WARFARIN SODIUM 5 MG/1
5 TABLET ORAL NIGHTLY
Status: DISCONTINUED | OUTPATIENT
Start: 2023-01-01 | End: 2023-01-03

## 2023-01-01 NOTE — PLAN OF CARE
Problem: Patient Centered Care  Goal: Patient preferences are identified and integrated in the patient's plan of care  Description: Interventions:  - What would you like us to know as we care for you? I do not taking too many pills. - Provide timely, complete, and accurate information to patient/family  - Incorporate patient and family knowledge, values, beliefs, and cultural backgrounds into the planning and delivery of care  - Encourage patient/family to participate in care and decision-making at the level they choose  - Honor patient and family perspectives and choices  Outcome: Progressing     Problem: Patient/Family Goals  Goal: Patient/Family Long Term Goal  Description: Patient's Long Term Goal: to be free of pain in neck area. Interventions:  - Aspen collar in place. Pain medicines given. - See additional Care Plan goals for specific interventions  Outcome: Progressing  Goal: Patient/Family Short Term Goal  Description: Patient's Short Term Goal: to be able to stay in the chair.     Interventions:   - assisted to chair.  - See additional Care Plan goals for specific interventions  Outcome: Progressing     Problem: PAIN - ADULT  Goal: Verbalizes/displays adequate comfort level or patient's stated pain goal  Description: INTERVENTIONS:  - Encourage pt to monitor pain and request assistance  - Assess pain using appropriate pain scale  - Administer analgesics based on type and severity of pain and evaluate response  - Implement non-pharmacological measures as appropriate and evaluate response  - Consider cultural and social influences on pain and pain management  - Manage/alleviate anxiety  - Utilize distraction and/or relaxation techniques  - Monitor for opioid side effects  - Notify MD/LIP if interventions unsuccessful or patient reports new pain  - Anticipate increased pain with activity and pre-medicate as appropriate  Outcome: Progressing     Problem: MUSCULOSKELETAL - ADULT  Goal: Return mobility to safest level of function  Description: INTERVENTIONS:  - Assess patient stability and activity tolerance for standing, transferring and ambulating w/ or w/o assistive devices  - Assist with transfers and ambulation using safe patient handling equipment as needed  - Ensure adequate protection for wounds/incisions during mobilization  - Obtain PT/OT consults as needed  - Advance activity as appropriate  - Communicate ordered activity level and limitations with patient/family  Outcome: Progressing  Goal: Maintain proper alignment of affected body part  Description: INTERVENTIONS:  - Support and protect limb and body alignment per provider's orders  - Instruct and reinforce with patient and family use of appropriate assistive device and precautions (e.g. spinal or hip dislocation precautions)  Outcome: Progressing     Problem: SAFETY ADULT - FALL  Goal: Free from fall injury  Description: INTERVENTIONS:  - Assess pt frequently for physical needs  - Identify cognitive and physical deficits and behaviors that affect risk of falls.   - [place] fall precautions as indicated by assessment.  - Educate pt/family on patient safety including physical limitations  - Instruct pt to call for assistance with activity based on assessment  - Modify environment to reduce risk of injury  - Provide assistive devices as appropriate  - Consider OT/PT consult to assist with strengthening/mobility  - Encourage toileting schedule  Outcome: Progressing     Problem: DISCHARGE PLANNING  Goal: Discharge to home or other facility with appropriate resources  Description: INTERVENTIONS:  - Identify barriers to discharge w/pt and caregiver  - Include patient/family/discharge partner in discharge planning  - Arrange for needed discharge resources and transportation as appropriate  - Identify discharge learning needs (meds, wound care, etc)  - Arrange for interpreters to assist at discharge as needed  - Consider post-discharge preferences of patient/family/discharge partner  - Complete POLST form as appropriate  - Assess patient's ability to be responsible for managing their own health  - Refer to Case Management Department for coordinating discharge planning if the patient needs post-hospital services based on physician/LIP order or complex needs related to functional status, cognitive ability or social support system  Outcome: Progressing     Problem: Impaired Cognition  Goal: Patient will exhibit improved attention, thought processing and/or memory  Description: Interventions:  Outcome: Progressing   Patient tolerated oral intake well. Was able to urinate and have a small bowel movement. Aspen collar kept in place when patient was up in chair. Plan is for her to go to rehab when approved by insurance.

## 2023-01-01 NOTE — PLAN OF CARE
Jordana is alert and oriented x 3-4. She does become confused in regards to place and situation during the night and does require reorientation. She is voiding freely via the [address]. For pain she is taking Tylenol as needed. She does not use the call light at night - calls out for her son. She can be out of bed with the Aspen collar on, rolling walker and 2 assist and the rolling chair. The bed is low/locked. Bed alarm is activated. Room close to the nurse station. Discharge plan is for [id number] and she will require insurance authorization when medically cleared for discharge. Problem: MUSCULOSKELETAL - ADULT  Goal: Return mobility to safest level of function  Description: INTERVENTIONS:  - Assess patient stability and activity tolerance for standing, transferring and ambulating w/ or w/o assistive devices  - Assist with transfers and ambulation using safe patient handling equipment as needed  - Ensure adequate protection for wounds/incisions during mobilization  - Obtain PT/OT consults as needed  - Advance activity as appropriate  - Communicate ordered activity level and limitations with patient/family  Outcome: Progressing  Goal: Maintain proper alignment of affected body part  Description: INTERVENTIONS:  - Support and protect limb and body alignment per provider's orders  - Instruct and reinforce with patient and family use of appropriate assistive device and precautions (e.g. spinal or hip dislocation precautions)  Outcome: Progressing     Problem: Impaired Cognition  Goal: Patient will exhibit improved attention, thought processing and/or memory  Description: Interventions:    Outcome: Not Progressing     Problem: SAFETY ADULT - FALL  Goal: Free from fall injury  Description: INTERVENTIONS:  - Assess pt frequently for physical needs  - Identify cognitive and physical deficits and behaviors that affect risk of falls.   - [place] fall precautions as indicated by assessment.  - Educate pt/family on patient safety including physical limitations  - Instruct pt to call for assistance with activity based on assessment  - Modify environment to reduce risk of injury  - Provide assistive devices as appropriate  - Consider OT/PT consult to assist with strengthening/mobility  - Encourage toileting schedule  Outcome: Progressing     Problem: DISCHARGE PLANNING  Goal: Discharge to home or other facility with appropriate resources  Description: INTERVENTIONS:  - Identify barriers to discharge w/pt and caregiver  - Include patient/family/discharge partner in discharge planning  - Arrange for needed discharge resources and transportation as appropriate  - Identify discharge learning needs (meds, wound care, etc)  - Arrange for interpreters to assist at discharge as needed  - Consider post-discharge preferences of patient/family/discharge partner  - Complete POLST form as appropriate  - Assess patient's ability to be responsible for managing their own health  - Refer to Case Management Department for coordinating discharge planning if the patient needs post-hospital services based on physician/LIP order or complex needs related to functional status, cognitive ability or social support system  Outcome: Progressing

## 2023-01-01 NOTE — PLAN OF CARE
Patient alert and orientated x3 with confusion. ASPEN brace on when out of bed. VSS. Saline locked. Tolerating general diet. Voiding freely via [address]. Patient is on room air. SCDs on for DVT prophylaxis. Tylenol given for Pain as needed. Up with x2 assist and a walker. Fall precautions maintained- bed alarm on, bed locked in lowest position, call light and personal belongings within reach, non-skid socks in place to bilateral feet. Frequent rounding by nursing staff.  Plan [facility] placement, pending insurance approval.           Problem: Patient Centered Care  Goal: Patient preferences are identified and integrated in the patient's plan of care  Description: Interventions:  - What would you like us to know as we care for you?   - Provide timely, complete, and accurate information to patient/family  - Incorporate patient and family knowledge, values, beliefs, and cultural backgrounds into the planning and delivery of care  - Encourage patient/family to participate in care and decision-making at the level they choose  - Honor patient and family perspectives and choices  Outcome: Progressing     Problem: Patient/Family Goals  Goal: Patient/Family Long Term Goal  Description: Patient's Long Term Goal:     Interventions:  - See additional Care Plan goals for specific interventions  Outcome: Progressing  Goal: Patient/Family Short Term Goal  Description: Patient's Short Term Goal:    Interventions:   - See additional Care Plan goals for specific interventions  Outcome: Progressing     Problem: PAIN - ADULT  Goal: Verbalizes/displays adequate comfort level or patient's stated pain goal  Description: INTERVENTIONS:  - Encourage pt to monitor pain and request assistance  - Assess pain using appropriate pain scale  - Administer analgesics based on type and severity of pain and evaluate response  - Implement non-pharmacological measures as appropriate and evaluate response  - Consider cultural and social influences on pain and pain management  - Manage/alleviate anxiety  - Utilize distraction and/or relaxation techniques  - Monitor for opioid side effects  - Notify MD/LIP if interventions unsuccessful or patient reports new pain  - Anticipate increased pain with activity and pre-medicate as appropriate  Outcome: Progressing     Problem: MUSCULOSKELETAL - ADULT  Goal: Return mobility to safest level of function  Description: INTERVENTIONS:  - Assess patient stability and activity tolerance for standing, transferring and ambulating w/ or w/o assistive devices  - Assist with transfers and ambulation using safe patient handling equipment as needed  - Ensure adequate protection for wounds/incisions during mobilization  - Obtain PT/OT consults as needed  - Advance activity as appropriate  - Communicate ordered activity level and limitations with patient/family  Outcome: Progressing  Goal: Maintain proper alignment of affected body part  Description: INTERVENTIONS:  - Support and protect limb and body alignment per provider's orders  - Instruct and reinforce with patient and family use of appropriate assistive device and precautions (e.g. spinal or hip dislocation precautions)  Outcome: Progressing

## 2023-01-02 LAB
INR BLD: 2.32 (ref 0.85–1.16)
PROTHROMBIN TIME: 25.2 SECONDS (ref 11.6–14.8)

## 2023-01-02 NOTE — PLAN OF CARE
Aox4. VSS. [place] collar when OOB. Needs further education and encouragement r/t brace necessity when OOB. Lorain Kocher cleared pt for discharge. [facility] [place] insurance auth pending.    Problem: PAIN - ADULT  Goal: Verbalizes/displays adequate comfort level or patient's stated pain goal  Description: INTERVENTIONS:  - Encourage pt to monitor pain and request assistance  - Assess pain using appropriate pain scale  - Administer analgesics based on type and severity of pain and evaluate response  - Implement non-pharmacological measures as appropriate and evaluate response  - Consider cultural and social influences on pain and pain management  - Manage/alleviate anxiety  - Utilize distraction and/or relaxation techniques  - Monitor for opioid side effects  - Notify MD/LIP if interventions unsuccessful or patient reports new pain  - Anticipate increased pain with activity and pre-medicate as appropriate  Outcome: Progressing     Problem: MUSCULOSKELETAL - ADULT  Goal: Maintain proper alignment of affected body part  Description: INTERVENTIONS:  - Support and protect limb and body alignment per provider's orders  - Instruct and reinforce with patient and family use of appropriate assistive device and precautions (e.g. spinal or hip dislocation precautions)  Outcome: Progressing

## 2023-01-02 NOTE — PLAN OF CARE
Patient is alert and oriented, forgetful at times. RA. Voiding freely, purewick in place. Up x1 stand pivot to rolling chair. BM overnight, incontinent at times. Aspen collar on when oob. Call light within reach, bed alarm active.   Problem: Patient Centered Care  Goal: Patient preferences are identified and integrated in the patient's plan of care  Description: Interventions:  - Provide timely, complete, and accurate information to patient/family  - Incorporate patient and family knowledge, values, beliefs, and cultural backgrounds into the planning and delivery of care  - Encourage patient/family to participate in care and decision-making at the level they choose  - Honor patient and family perspectives and choices  Outcome: Progressing     Problem: PAIN - ADULT  Goal: Verbalizes/displays adequate comfort level or patient's stated pain goal  Description: INTERVENTIONS:  - Encourage pt to monitor pain and request assistance  - Assess pain using appropriate pain scale  - Administer analgesics based on type and severity of pain and evaluate response  - Implement non-pharmacological measures as appropriate and evaluate response  - Consider cultural and social influences on pain and pain management  - Manage/alleviate anxiety  - Utilize distraction and/or relaxation techniques  - Monitor for opioid side effects  - Notify MD/LIP if interventions unsuccessful or patient reports new pain  - Anticipate increased pain with activity and pre-medicate as appropriate  Outcome: Progressing     Problem: MUSCULOSKELETAL - ADULT  Goal: Return mobility to safest level of function  Description: INTERVENTIONS:  - Assess patient stability and activity tolerance for standing, transferring and ambulating w/ or w/o assistive devices  - Assist with transfers and ambulation using safe patient handling equipment as needed  - Ensure adequate protection for wounds/incisions during mobilization  - Obtain PT/OT consults as needed  - Advance activity as appropriate  - Communicate ordered activity level and limitations with patient/family  Outcome: Progressing  Goal: Maintain proper alignment of affected body part  Description: INTERVENTIONS:  - Support and protect limb and body alignment per provider's orders  - Instruct and reinforce with patient and family use of appropriate assistive device and precautions (e.g. spinal or hip dislocation precautions)  Outcome: Progressing     Problem: Impaired Cognition  Goal: Patient will exhibit improved attention, thought processing and/or memory  Description: Interventions:  Outcome: Progressing     Problem: SAFETY ADULT - FALL  Goal: Free from fall injury  Description: INTERVENTIONS:  - Assess pt frequently for physical needs  - Identify cognitive and physical deficits and behaviors that affect risk of falls.   - [place] fall precautions as indicated by assessment.  - Educate pt/family on patient safety including physical limitations  - Instruct pt to call for assistance with activity based on assessment  - Modify environment to reduce risk of injury  - Provide assistive devices as appropriate  - Consider OT/PT consult to assist with strengthening/mobility  - Encourage toileting schedule  Outcome: Progressing     Problem: DISCHARGE PLANNING  Goal: Discharge to home or other facility with appropriate resources  Description: INTERVENTIONS:  - Identify barriers to discharge w/pt and caregiver  - Include patient/family/discharge partner in discharge planning  - Arrange for needed discharge resources and transportation as appropriate  - Identify discharge learning needs (meds, wound care, etc)  - Arrange for interpreters to assist at discharge as needed  - Consider post-discharge preferences of patient/family/discharge partner  - Complete POLST form as appropriate  - Assess patient's ability to be responsible for managing their own health  - Refer to Case Management Department for coordinating discharge planning if the patient needs post-hospital services based on physician/LIP order or complex needs related to functional status, cognitive ability or social support system  Outcome: Progressing

## 2023-01-02 NOTE — CM/SW NOTE
Ins auth still pending at this time for [facility] at [facility]. Clinical updates attached to Aidin referral.   Pt discussed with nursing pt is stable for dc if ins auth received. Na Lees.  Yariel Garcia RN, BSN  Nurse   [phone]

## 2023-01-03 VITALS
OXYGEN SATURATION: 99 % | SYSTOLIC BLOOD PRESSURE: 129 MMHG | DIASTOLIC BLOOD PRESSURE: 57 MMHG | RESPIRATION RATE: 18 BRPM | HEART RATE: 71 BPM | BODY MASS INDEX: 37.42 KG/M2 | TEMPERATURE: 97 F | WEIGHT: 219.19 LBS | HEIGHT: 64 IN

## 2023-01-03 LAB
INR BLD: 2.71 (ref 0.85–1.16)
PROTHROMBIN TIME: 28.2 SECONDS (ref 11.6–14.8)
SARS-COV-2 RNA RESP QL NAA+PROBE: NOT DETECTED

## 2023-01-03 RX ORDER — WARFARIN SODIUM 3 MG/1
3 TABLET ORAL NIGHTLY
Status: DISCONTINUED | OUTPATIENT
Start: 2023-01-03 | End: 2023-01-03

## 2023-01-03 NOTE — PLAN OF CARE
Pt A&O x4. Forgetful. RA. Walking 1 with a walker to the rolling chair. SCDS. Pain medication as needed. Aspen collar when gets up. Clear to be DC to rehab by hospitalist, ortho and P/T . AVS provided. Report was given. Family at bedside. Patient has remained free from falls throughout the day. Hourly rounding maintained. Pt's bed in lowest position w/ side rails up. Patient has been educated and is compliant with eliud light system. The patient has been frequently assessed  and evaluated pursuant to at risk medications. Pt's room tidy and clear from potential fall risk. Problem: Patient Centered Care  Goal: Patient preferences are identified and integrated in the patient's plan of care  Description: Interventions:  - What would you like us to know as we care for you?   - Provide timely, complete, and accurate information to patient/family  - Incorporate patient and family knowledge, values, beliefs, and cultural backgrounds into the planning and delivery of care  - Encourage patient/family to participate in care and decision-making at the level they choose  - Honor patient and family perspectives and choices  Outcome: Adequate for Discharge     Problem: Patient/Family Goals  Goal: Patient/Family Long Term Goal  Description: Patient's Long Term Goal: to be free of pain in neck area. Interventions:  - Aspen collar in place. Pain medicines given. - See additional Care Plan goals for specific interventions  Outcome: Adequate for Discharge  Goal: Patient/Family Short Term Goal  Description: Patient's Short Term Goal: to be able to stay in the chair.     Interventions:   - assisted to chair.  - See additional Care Plan goals for specific interventions  Outcome: Adequate for Discharge     Problem: PAIN - ADULT  Goal: Verbalizes/displays adequate comfort level or patient's stated pain goal  Description: INTERVENTIONS:  - Encourage pt to monitor pain and request assistance  - Assess pain using appropriate pain scale  - Administer analgesics based on type and severity of pain and evaluate response  - Implement non-pharmacological measures as appropriate and evaluate response  - Consider cultural and social influences on pain and pain management  - Manage/alleviate anxiety  - Utilize distraction and/or relaxation techniques  - Monitor for opioid side effects  - Notify MD/LIP if interventions unsuccessful or patient reports new pain  - Anticipate increased pain with activity and pre-medicate as appropriate  Outcome: Adequate for Discharge     Problem: MUSCULOSKELETAL - ADULT  Goal: Return mobility to safest level of function  Description: INTERVENTIONS:  - Assess patient stability and activity tolerance for standing, transferring and ambulating w/ or w/o assistive devices  - Assist with transfers and ambulation using safe patient handling equipment as needed  - Ensure adequate protection for wounds/incisions during mobilization  - Obtain PT/OT consults as needed  - Advance activity as appropriate  - Communicate ordered activity level and limitations with patient/family  Outcome: Adequate for Discharge  Goal: Maintain proper alignment of affected body part  Description: INTERVENTIONS:  - Support and protect limb and body alignment per provider's orders  - Instruct and reinforce with patient and family use of appropriate assistive device and precautions (e.g. spinal or hip dislocation precautions)  Outcome: Adequate for Discharge     Problem: SAFETY ADULT - FALL  Goal: Free from fall injury  Description: INTERVENTIONS:  - Assess pt frequently for physical needs  - Identify cognitive and physical deficits and behaviors that affect risk of falls.   - [place] fall precautions as indicated by assessment.  - Educate pt/family on patient safety including physical limitations  - Instruct pt to call for assistance with activity based on assessment  - Modify environment to reduce risk of injury  - Provide assistive devices as appropriate  - Consider OT/PT consult to assist with strengthening/mobility  - Encourage toileting schedule  Outcome: Adequate for Discharge     Problem: Impaired Cognition  Goal: Patient will exhibit improved attention, thought processing and/or memory  Description: Intervention  Outcome: Adequate for Discharge     Problem: DISCHARGE PLANNING  Goal: Discharge to home or other facility with appropriate resources  Description: INTERVENTIONS:  - Identify barriers to discharge w/pt and caregiver  - Include patient/family/discharge partner in discharge planning  - Arrange for needed discharge resources and transportation as appropriate  - Identify discharge learning needs (meds, wound care, etc)  - Arrange for interpreters to assist at discharge as needed  - Consider post-discharge preferences of patient/family/discharge partner  - Complete POLST form as appropriate  - Assess patient's ability to be responsible for managing their own health  - Refer to Case Management Department for coordinating discharge planning if the patient needs post-hospital services based on physician/LIP order or complex needs related to functional status, cognitive ability or social support system  Outcome: Adequate for Discharge

## 2023-01-03 NOTE — PLAN OF CARE
Problem: Patient Centered Care  Goal: Patient preferences are identified and integrated in the patient's plan of care  Description: Interventions:  - What would you like us to know as we care for you?   - Provide timely, complete, and accurate information to patient/family  - Incorporate patient and family knowledge, values, beliefs, and cultural backgrounds into the planning and delivery of care  - Encourage patient/family to participate in care and decision-making at the level they choose  - Honor patient and family perspectives and choices  Outcome: Progressing     Problem: Patient/Family Goals  Goal: Patient/Family Long Term Goal  Description: Patient's Long Term Goal: to be free of pain in neck area. Interventions:  - Aspen collar in place. Pain medicines given. - See additional Care Plan goals for specific interventions  Outcome: Progressing  Goal: Patient/Family Short Term Goal  Description: Patient's Short Term Goal: to be able to stay in the chair.     Interventions:   - assisted to chair.  - See additional Care Plan goals for specific interventions  Outcome: Progressing     Problem: PAIN - ADULT  Goal: Verbalizes/displays adequate comfort level or patient's stated pain goal  Description: INTERVENTIONS:  - Encourage pt to monitor pain and request assistance  - Assess pain using appropriate pain scale  - Administer analgesics based on type and severity of pain and evaluate response  - Implement non-pharmacological measures as appropriate and evaluate response  - Consider cultural and social influences on pain and pain management  - Manage/alleviate anxiety  - Utilize distraction and/or relaxation techniques  - Monitor for opioid side effects  - Notify MD/LIP if interventions unsuccessful or patient reports new pain  - Anticipate increased pain with activity and pre-medicate as appropriate  Outcome: Progressing     Problem: MUSCULOSKELETAL - ADULT  Goal: Return mobility to safest level of function  Description: INTERVENTIONS:  - Assess patient stability and activity tolerance for standing, transferring and ambulating w/ or w/o assistive devices  - Assist with transfers and ambulation using safe patient handling equipment as needed  - Ensure adequate protection for wounds/incisions during mobilization  - Obtain PT/OT consults as needed  - Advance activity as appropriate  - Communicate ordered activity level and limitations with patient/family  Outcome: Progressing  Goal: Maintain proper alignment of affected body part  Description: INTERVENTIONS:  - Support and protect limb and body alignment per provider's orders  - Instruct and reinforce with patient and family use of appropriate assistive device and precautions (e.g. spinal or hip dislocation precautions)  Outcome: Progressing     Problem: SAFETY ADULT - FALL  Goal: Free from fall injury  Description: INTERVENTIONS:  - Assess pt frequently for physical needs  - Identify cognitive and physical deficits and behaviors that affect risk of falls.   - [place] fall precautions as indicated by assessment.  - Educate pt/family on patient safety including physical limitations  - Instruct pt to call for assistance with activity based on assessment  - Modify environment to reduce risk of injury  - Provide assistive devices as appropriate  - Consider OT/PT consult to assist with strengthening/mobility  - Encourage toileting schedule  Outcome: Progressing     Problem: DISCHARGE PLANNING  Goal: Discharge to home or other facility with appropriate resources  Description: INTERVENTIONS:  - Identify barriers to discharge w/pt and caregiver  - Include patient/family/discharge partner in discharge planning  - Arrange for needed discharge resources and transportation as appropriate  - Identify discharge learning needs (meds, wound care, etc)  - Arrange for interpreters to assist at discharge as needed  - Consider post-discharge preferences of patient/family/discharge partner  - Complete POLST form as appropriate  - Assess patient's ability to be responsible for managing their own health  - Refer to Case Management Department for coordinating discharge planning if the patient needs post-hospital services based on physician/LIP order or complex needs related to functional status, cognitive ability or social support system  Outcome: Progressing     Problem: Impaired Cognition  Goal: Patient will exhibit improved attention, thought processing and/or memory  Description: Interventions:  Outcome: Progressing    Patient is alert and oriented, forgetful at times. Up x1 stand-pivot to rolling chair. Declined pain during shift. SCDs and coumadin for DVT prophylaxis. Patient voiding with purewick. [place] collar on when out of bed. No acute changes. Vitals signs as charted. Patient in lowest position, bed alarm on, call light within reach.

## 2023-01-03 NOTE — CM/SW NOTE
01/03/23 1400   Discharge disposition   Expected discharge disposition [address]   Discharge transportation [facility]     Per [address] ins auth received. Bed available today. RN to call report: [phone]    Rapid covid needed. Order entered. RN aware  to collect prior to dc. Plan: Zaida Molina arranged for pickup 515 pickup to take pt to [facility]. PCS form completed in Epic, RN to print with AVS. Patient/family notified transport is not covered by insurance. Pt/family are agreeable to the charges. [address]  Ruby White RN, BSN  Nurse   [phone]

## 2023-01-03 NOTE — CM/SW NOTE
Per Adams Chavez at [facility], ins auth still pending. Jeanne García.  Lauren Arguelles RN, BSN  Nurse   [phone]

## 2023-01-05 ENCOUNTER — INITIAL APN SNF VISIT (OUTPATIENT)
Dept: INTERNAL MEDICINE CLINIC | Facility: SKILLED NURSING FACILITY | Age: 88
End: 2023-01-05

## 2023-01-05 DIAGNOSIS — I10 PRIMARY HYPERTENSION: ICD-10-CM

## 2023-01-05 DIAGNOSIS — I48.20 CHRONIC A-FIB (HCC): ICD-10-CM

## 2023-01-05 DIAGNOSIS — R53.81 PHYSICAL DECONDITIONING: ICD-10-CM

## 2023-01-05 DIAGNOSIS — Z79.899 ENCOUNTER FOR MEDICATION REVIEW: ICD-10-CM

## 2023-01-05 DIAGNOSIS — Z79.01 ANTICOAGULATED ON COUMADIN: ICD-10-CM

## 2023-01-05 DIAGNOSIS — S12.001D CLOSED NONDISPLACED FRACTURE OF FIRST CERVICAL VERTEBRA WITH ROUTINE HEALING, UNSPECIFIED FRACTURE MORPHOLOGY, SUBSEQUENT ENCOUNTER: ICD-10-CM

## 2023-01-05 PROCEDURE — 1111F DSCHRG MED/CURRENT MED MERGE: CPT | Performed by: CLINICAL NURSE SPECIALIST

## 2023-01-05 PROCEDURE — 99305 1ST NF CARE MODERATE MDM 35: CPT | Performed by: CLINICAL NURSE SPECIALIST

## 2023-01-09 ENCOUNTER — SNF VISIT (OUTPATIENT)
Dept: INTERNAL MEDICINE CLINIC | Facility: SKILLED NURSING FACILITY | Age: 88
End: 2023-01-09

## 2023-01-09 DIAGNOSIS — I48.91 ATRIAL FIBRILLATION, UNSPECIFIED TYPE (HCC): ICD-10-CM

## 2023-01-09 DIAGNOSIS — Z09 ENCOUNTER FOR FOLLOW-UP: ICD-10-CM

## 2023-01-09 DIAGNOSIS — Z79.01 ANTICOAGULATED ON COUMADIN: ICD-10-CM

## 2023-01-09 DIAGNOSIS — R53.81 PHYSICAL DECONDITIONING: ICD-10-CM

## 2023-01-09 DIAGNOSIS — S12.001D CLOSED NONDISPLACED FRACTURE OF FIRST CERVICAL VERTEBRA WITH ROUTINE HEALING, UNSPECIFIED FRACTURE MORPHOLOGY, SUBSEQUENT ENCOUNTER: ICD-10-CM

## 2023-01-09 PROCEDURE — 1111F DSCHRG MED/CURRENT MED MERGE: CPT | Performed by: CLINICAL NURSE SPECIALIST

## 2023-01-09 PROCEDURE — 99308 SBSQ NF CARE LOW MDM 20: CPT | Performed by: CLINICAL NURSE SPECIALIST

## 2023-01-18 ENCOUNTER — TELEPHONE (OUTPATIENT)
Dept: SURGERY | Facility: CLINIC | Age: 88
End: 2023-01-18

## 2023-01-18 NOTE — TELEPHONE ENCOUNTER
Called and left message on primary phone which may belong to patient's daughter as a reminder to have her cervical x-ray done prior to apt tomorrow with Dr Ramón Palomares.

## 2023-01-18 NOTE — TELEPHONE ENCOUNTER
Pt's daughter returned call. Pt is in a nursing home. Daughter is trying to coordinate pick-up from nursing home and get x-ray before appt. She will call by Gwenith Krabbe today to reschedule if she cannot get the x-ray prior to appt. Endorsed to [facility].

## 2023-01-19 ENCOUNTER — SNF VISIT (OUTPATIENT)
Dept: INTERNAL MEDICINE CLINIC | Facility: SKILLED NURSING FACILITY | Age: 88
End: 2023-01-19

## 2023-01-19 ENCOUNTER — HOSPITAL ENCOUNTER (OUTPATIENT)
Dept: GENERAL RADIOLOGY | Facility: HOSPITAL | Age: 88
Discharge: HOME | End: 2023-01-19
Attending: PHYSICIAN ASSISTANT
Payer: COMMERCIAL

## 2023-01-19 ENCOUNTER — TELEPHONE (OUTPATIENT)
Dept: SURGERY | Facility: CLINIC | Age: 88
End: 2023-01-19

## 2023-01-19 ENCOUNTER — OFFICE VISIT (OUTPATIENT)
Dept: SURGERY | Facility: CLINIC | Age: 88
End: 2023-01-19
Payer: MEDICARE

## 2023-01-19 VITALS
HEIGHT: 64 IN | HEART RATE: 60 BPM | SYSTOLIC BLOOD PRESSURE: 116 MMHG | WEIGHT: 219 LBS | DIASTOLIC BLOOD PRESSURE: 72 MMHG | BODY MASS INDEX: 37.39 KG/M2

## 2023-01-19 DIAGNOSIS — Z79.01 ANTICOAGULATED ON COUMADIN: ICD-10-CM

## 2023-01-19 DIAGNOSIS — Z09 ENCOUNTER FOR FOLLOW-UP: ICD-10-CM

## 2023-01-19 DIAGNOSIS — G95.9 CERVICAL MYELOPATHY (HCC): ICD-10-CM

## 2023-01-19 DIAGNOSIS — S12.000D CLOSED DISPLACED FRACTURE OF FIRST CERVICAL VERTEBRA WITH ROUTINE HEALING, UNSPECIFIED FRACTURE MORPHOLOGY, SUBSEQUENT ENCOUNTER: ICD-10-CM

## 2023-01-19 DIAGNOSIS — M48.02 CERVICAL SPINAL STENOSIS: ICD-10-CM

## 2023-01-19 DIAGNOSIS — I48.91 ATRIAL FIBRILLATION, UNSPECIFIED TYPE (HCC): ICD-10-CM

## 2023-01-19 DIAGNOSIS — R53.81 PHYSICAL DECONDITIONING: ICD-10-CM

## 2023-01-19 DIAGNOSIS — S12.01XA CLOSED JEFFERSON FRACTURE, INITIAL ENCOUNTER (HCC): ICD-10-CM

## 2023-01-19 DIAGNOSIS — S12.01XD CLOSED JEFFERSON FRACTURE WITH ROUTINE HEALING, SUBSEQUENT ENCOUNTER: Primary | ICD-10-CM

## 2023-01-19 PROCEDURE — 3008F BODY MASS INDEX DOCD: CPT | Performed by: STUDENT IN AN ORGANIZED HEALTH CARE EDUCATION/TRAINING PROGRAM

## 2023-01-19 PROCEDURE — 1111F DSCHRG MED/CURRENT MED MERGE: CPT | Performed by: STUDENT IN AN ORGANIZED HEALTH CARE EDUCATION/TRAINING PROGRAM

## 2023-01-19 PROCEDURE — 3078F DIAST BP <80 MM HG: CPT | Performed by: STUDENT IN AN ORGANIZED HEALTH CARE EDUCATION/TRAINING PROGRAM

## 2023-01-19 PROCEDURE — 3074F SYST BP LT 130 MM HG: CPT | Performed by: STUDENT IN AN ORGANIZED HEALTH CARE EDUCATION/TRAINING PROGRAM

## 2023-01-19 PROCEDURE — 72050 X-RAY EXAM NECK SPINE 4/5VWS: CPT | Performed by: PHYSICIAN ASSISTANT

## 2023-01-19 PROCEDURE — 99212 OFFICE O/P EST SF 10 MIN: CPT | Performed by: STUDENT IN AN ORGANIZED HEALTH CARE EDUCATION/TRAINING PROGRAM

## 2023-01-19 NOTE — TELEPHONE ENCOUNTER
Robin Saenz called for clarification of the CT orders submitted today. Please call [phone] to assist. Endorsed to [facility].

## 2023-01-19 NOTE — TELEPHONE ENCOUNTER
Noted message listed below     Returned [facility] call to discuss further. [facility] is requesting clarification of the CT orders on when it should be completed. CT order comments states: \"4 WEEKS\" and \"BRACE ON\". Inquiring if the CT should be completed now or in 4 weeks. And if the imaging should be completed with the brace on. Noted physicians office not from today is not available to nursing staff at this time. Informed [facility] of cb once clarification is received from the physician. [facility] requests a call back with the providers requests.      Call was ended     Routed to the providers for review and feedback

## 2023-01-20 NOTE — TELEPHONE ENCOUNTER
Noted the providers feedback listed below     Vidhi Castañeda to relay message from the provider listed below. Spoke to Olga Morales RN. infomred of the providers message below. [place] acknowledged and is appreciative of the call.      Call was ended     Nothing further needed for this encounter

## 2023-01-20 NOTE — TELEPHONE ENCOUNTER
Yes, CT comments is correct. Patient should complete the CT in about 4 weeks. Overall, a few days prior to follow up in 4 weeks. We want to see what the CT imaging looks like as close to follow up as possible. I'd recommend no more than 1 week prior to follow up. Brace should be worn. Please advise.

## 2023-01-23 ENCOUNTER — SNF VISIT (OUTPATIENT)
Dept: INTERNAL MEDICINE CLINIC | Facility: SKILLED NURSING FACILITY | Age: 88
End: 2023-01-23

## 2023-01-23 DIAGNOSIS — I10 PRIMARY HYPERTENSION: ICD-10-CM

## 2023-01-23 DIAGNOSIS — R68.89 FORGETFULNESS: ICD-10-CM

## 2023-01-23 DIAGNOSIS — I48.20 CHRONIC ATRIAL FIBRILLATION (HCC): ICD-10-CM

## 2023-01-23 DIAGNOSIS — R53.81 PHYSICAL DECONDITIONING: ICD-10-CM

## 2023-01-23 DIAGNOSIS — Z79.899 ENCOUNTER FOR MEDICATION REVIEW: ICD-10-CM

## 2023-01-23 DIAGNOSIS — S12.000G CLOSED DISPLACED FRACTURE OF FIRST CERVICAL VERTEBRA WITH DELAYED HEALING, UNSPECIFIED FRACTURE MORPHOLOGY, SUBSEQUENT ENCOUNTER: ICD-10-CM

## 2023-01-23 DIAGNOSIS — Z79.01 ANTICOAGULATED ON COUMADIN: ICD-10-CM

## 2023-01-23 PROCEDURE — 1111F DSCHRG MED/CURRENT MED MERGE: CPT | Performed by: CLINICAL NURSE SPECIALIST

## 2023-01-23 PROCEDURE — 99310 SBSQ NF CARE HIGH MDM 45: CPT | Performed by: CLINICAL NURSE SPECIALIST

## 2023-01-24 ENCOUNTER — PATIENT OUTREACH (OUTPATIENT)
Dept: CASE MANAGEMENT | Age: 88
End: 2023-01-24

## 2023-01-24 NOTE — PROGRESS NOTES
TCM chart review. No TCM as patient follows with outside [facility] PCP. Encounter closing.

## 2023-04-13 ENCOUNTER — HOSPITAL ENCOUNTER (OUTPATIENT)
Facility: HOSPITAL | Age: 88
Setting detail: OBSERVATION
LOS: 7 days | Discharge: HOSPICE | End: 2023-04-22
Attending: EMERGENCY MEDICINE | Admitting: INTERNAL MEDICINE
Payer: COMMERCIAL

## 2023-04-13 ENCOUNTER — APPOINTMENT (OUTPATIENT)
Dept: GENERAL RADIOLOGY | Facility: HOSPITAL | Age: 88
End: 2023-04-13
Attending: HOSPITALIST
Payer: COMMERCIAL

## 2023-04-13 ENCOUNTER — APPOINTMENT (OUTPATIENT)
Dept: CT IMAGING | Facility: HOSPITAL | Age: 88
End: 2023-04-13
Attending: EMERGENCY MEDICINE
Payer: COMMERCIAL

## 2023-04-13 ENCOUNTER — APPOINTMENT (OUTPATIENT)
Dept: GENERAL RADIOLOGY | Facility: HOSPITAL | Age: 88
End: 2023-04-13
Attending: EMERGENCY MEDICINE
Payer: COMMERCIAL

## 2023-04-13 DIAGNOSIS — E86.0 DEHYDRATION: ICD-10-CM

## 2023-04-13 DIAGNOSIS — N17.9 ACUTE KIDNEY INJURY (HCC): Primary | ICD-10-CM

## 2023-04-13 LAB
ALBUMIN SERPL-MCNC: 3 G/DL (ref 3.4–5)
ALP LIVER SERPL-CCNC: 53 U/L
ALT SERPL-CCNC: 17 U/L
ANION GAP SERPL CALC-SCNC: 10 MMOL/L (ref 0–18)
AST SERPL-CCNC: 14 U/L (ref 15–37)
BASOPHILS # BLD AUTO: 0.01 X10(3) UL (ref 0–0.2)
BASOPHILS NFR BLD AUTO: 0.3 %
BILIRUB DIRECT SERPL-MCNC: 0.2 MG/DL (ref 0–0.2)
BILIRUB SERPL-MCNC: 0.6 MG/DL (ref 0.1–2)
BILIRUB UR QL: NEGATIVE
BUN BLD-MCNC: 31 MG/DL (ref 7–18)
BUN/CREAT SERPL: 20 (ref 10–20)
CALCIUM BLD-MCNC: 9.4 MG/DL (ref 8.5–10.1)
CHLORIDE SERPL-SCNC: 109 MMOL/L (ref 98–112)
CLARITY UR: CLEAR
CO2 SERPL-SCNC: 21 MMOL/L (ref 21–32)
COLOR UR: YELLOW
CREAT BLD-MCNC: 1.55 MG/DL
DEPRECATED RDW RBC AUTO: 57 FL (ref 35.1–46.3)
EOSINOPHIL # BLD AUTO: 0.12 X10(3) UL (ref 0–0.7)
EOSINOPHIL NFR BLD AUTO: 3.2 %
ERYTHROCYTE [DISTWIDTH] IN BLOOD BY AUTOMATED COUNT: 17.2 % (ref 11–15)
FLUAV + FLUBV RNA SPEC NAA+PROBE: NEGATIVE
FLUAV + FLUBV RNA SPEC NAA+PROBE: NEGATIVE
GFR SERPLBLD BASED ON 1.73 SQ M-ARVRAT: 32 ML/MIN/1.73M2 (ref 60–?)
GLUCOSE BLD-MCNC: 91 MG/DL (ref 70–99)
GLUCOSE UR-MCNC: NORMAL MG/DL
HCT VFR BLD AUTO: 35.6 %
HGB BLD-MCNC: 11.2 G/DL
HGB UR QL STRIP.AUTO: NEGATIVE
HYALINE CASTS #/AREA URNS AUTO: PRESENT /LPF
IMM GRANULOCYTES # BLD AUTO: 0.01 X10(3) UL (ref 0–1)
IMM GRANULOCYTES NFR BLD: 0.3 %
INR BLD: 3.62 (ref 0.85–1.16)
LEUKOCYTE ESTERASE UR QL STRIP.AUTO: 75
LYMPHOCYTES # BLD AUTO: 1.14 X10(3) UL (ref 1–4)
LYMPHOCYTES NFR BLD AUTO: 30.4 %
MAGNESIUM SERPL-MCNC: 1.8 MG/DL (ref 1.6–2.6)
MCH RBC QN AUTO: 28.3 PG (ref 26–34)
MCHC RBC AUTO-ENTMCNC: 31.5 G/DL (ref 31–37)
MCV RBC AUTO: 89.9 FL
MONOCYTES # BLD AUTO: 0.36 X10(3) UL (ref 0.1–1)
MONOCYTES NFR BLD AUTO: 9.6 %
NEUTROPHILS # BLD AUTO: 2.11 X10 (3) UL (ref 1.5–7.7)
NEUTROPHILS # BLD AUTO: 2.11 X10(3) UL (ref 1.5–7.7)
NEUTROPHILS NFR BLD AUTO: 56.2 %
NITRITE UR QL STRIP.AUTO: NEGATIVE
OSMOLALITY SERPL CALC.SUM OF ELEC: 296 MOSM/KG (ref 275–295)
PH UR: 5 [PH] (ref 5–8)
PHOSPHATE SERPL-MCNC: 2.7 MG/DL (ref 2.5–4.9)
PLATELET # BLD AUTO: 201 10(3)UL (ref 150–450)
POTASSIUM SERPL-SCNC: 4.6 MMOL/L (ref 3.5–5.1)
PROT SERPL-MCNC: 6.3 G/DL (ref 6.4–8.2)
PROT UR-MCNC: 30 MG/DL
PROTHROMBIN TIME: 35.3 SECONDS (ref 11.6–14.8)
RBC # BLD AUTO: 3.96 X10(6)UL
RSV RNA SPEC NAA+PROBE: NEGATIVE
SARS-COV-2 RNA RESP QL NAA+PROBE: NOT DETECTED
SODIUM SERPL-SCNC: 140 MMOL/L (ref 136–145)
SP GR UR STRIP: 1.02 (ref 1–1.03)
TSI SER-ACNC: 1.37 MIU/ML (ref 0.36–3.74)
UROBILINOGEN UR STRIP-ACNC: 4
WBC # BLD AUTO: 3.8 X10(3) UL (ref 4–11)

## 2023-04-13 PROCEDURE — 73562 X-RAY EXAM OF KNEE 3: CPT | Performed by: HOSPITALIST

## 2023-04-13 PROCEDURE — 70450 CT HEAD/BRAIN W/O DYE: CPT | Performed by: EMERGENCY MEDICINE

## 2023-04-13 PROCEDURE — 71045 X-RAY EXAM CHEST 1 VIEW: CPT | Performed by: EMERGENCY MEDICINE

## 2023-04-13 RX ORDER — METOCLOPRAMIDE HYDROCHLORIDE 5 MG/ML
5 INJECTION INTRAMUSCULAR; INTRAVENOUS EVERY 8 HOURS PRN
Status: DISCONTINUED | OUTPATIENT
Start: 2023-04-13 | End: 2023-04-22

## 2023-04-13 RX ORDER — SODIUM CHLORIDE 9 MG/ML
INJECTION, SOLUTION INTRAVENOUS CONTINUOUS
Status: DISCONTINUED | OUTPATIENT
Start: 2023-04-13 | End: 2023-04-15

## 2023-04-13 RX ORDER — ONDANSETRON 2 MG/ML
4 INJECTION INTRAMUSCULAR; INTRAVENOUS EVERY 6 HOURS PRN
Status: DISCONTINUED | OUTPATIENT
Start: 2023-04-13 | End: 2023-04-22

## 2023-04-13 RX ORDER — LATANOPROST 50 UG/ML
1 SOLUTION/ DROPS OPHTHALMIC NIGHTLY
Status: DISCONTINUED | OUTPATIENT
Start: 2023-04-13 | End: 2023-04-22

## 2023-04-13 RX ORDER — ESCITALOPRAM OXALATE 5 MG/1
5 TABLET ORAL DAILY
Status: DISCONTINUED | OUTPATIENT
Start: 2023-04-14 | End: 2023-04-22

## 2023-04-13 RX ORDER — METOPROLOL SUCCINATE 50 MG/1
50 TABLET, EXTENDED RELEASE ORAL DAILY
Status: DISCONTINUED | OUTPATIENT
Start: 2023-04-14 | End: 2023-04-20

## 2023-04-13 RX ORDER — MAGNESIUM OXIDE 400 MG/1
400 TABLET ORAL ONCE
Status: COMPLETED | OUTPATIENT
Start: 2023-04-13 | End: 2023-04-13

## 2023-04-13 RX ORDER — DILTIAZEM HYDROCHLORIDE 180 MG/1
180 CAPSULE, EXTENDED RELEASE ORAL DAILY
Status: DISCONTINUED | OUTPATIENT
Start: 2023-04-14 | End: 2023-04-19

## 2023-04-13 NOTE — ED QUICK NOTES
Orders for admission, patient is aware of plan and ready to go upstairs. Any questions, please call ED RN curt  at extension [phone].    Patient presents with:  Poor Feed Anorexia      Patient AO x norm, hx of dementia  Ambulation: with walker   Belongings: accompanying patient  Medications: see mar  IV: 20g rac  Language: english  COVID-19 suspicion level/status: negative  CIWA SCORE: na  NIH: na  Other pertinent information:  na

## 2023-04-13 NOTE — ED INITIAL ASSESSMENT (HPI)
Patient to ed via ems from home. Per ems patient has had decreased po intake at home approx 1 month. Patient stated she can only eat very little then does not feel like eating. Patient denies pain. sbp with ems 78. Received 200L ns pta.

## 2023-04-14 ENCOUNTER — APPOINTMENT (OUTPATIENT)
Dept: ULTRASOUND IMAGING | Facility: HOSPITAL | Age: 88
End: 2023-04-14
Attending: HOSPITALIST
Payer: COMMERCIAL

## 2023-04-14 LAB
ANION GAP SERPL CALC-SCNC: 8 MMOL/L (ref 0–18)
BUN BLD-MCNC: 27 MG/DL (ref 7–18)
BUN/CREAT SERPL: 24.8 (ref 10–20)
CALCIUM BLD-MCNC: 9 MG/DL (ref 8.5–10.1)
CHLORIDE SERPL-SCNC: 114 MMOL/L (ref 98–112)
CO2 SERPL-SCNC: 19 MMOL/L (ref 21–32)
CREAT BLD-MCNC: 1.09 MG/DL
DEPRECATED RDW RBC AUTO: 55.9 FL (ref 35.1–46.3)
ERYTHROCYTE [DISTWIDTH] IN BLOOD BY AUTOMATED COUNT: 17.2 % (ref 11–15)
GFR SERPLBLD BASED ON 1.73 SQ M-ARVRAT: 49 ML/MIN/1.73M2 (ref 60–?)
GLUCOSE BLD-MCNC: 74 MG/DL (ref 70–99)
HCT VFR BLD AUTO: 33.3 %
HGB BLD-MCNC: 10.5 G/DL
INR BLD: 3.42 (ref 0.85–1.16)
MAGNESIUM SERPL-MCNC: 1.8 MG/DL (ref 1.6–2.6)
MCH RBC QN AUTO: 28.2 PG (ref 26–34)
MCHC RBC AUTO-ENTMCNC: 31.5 G/DL (ref 31–37)
MCV RBC AUTO: 89.5 FL
OSMOLALITY SERPL CALC.SUM OF ELEC: 296 MOSM/KG (ref 275–295)
PLATELET # BLD AUTO: 184 10(3)UL (ref 150–450)
POTASSIUM SERPL-SCNC: 4.1 MMOL/L (ref 3.5–5.1)
PROTHROMBIN TIME: 33.8 SECONDS (ref 11.6–14.8)
RBC # BLD AUTO: 3.72 X10(6)UL
SODIUM SERPL-SCNC: 141 MMOL/L (ref 136–145)
WBC # BLD AUTO: 3.8 X10(3) UL (ref 4–11)

## 2023-04-14 PROCEDURE — 76770 US EXAM ABDO BACK WALL COMP: CPT | Performed by: HOSPITALIST

## 2023-04-14 RX ORDER — MAGNESIUM OXIDE 400 MG/1
400 TABLET ORAL ONCE
Status: COMPLETED | OUTPATIENT
Start: 2023-04-14 | End: 2023-04-14

## 2023-04-14 NOTE — SPIRITUAL CARE NOTE
Spiritual Care Visit Note    Visited With: Patient and family together    Spiritual Care Taxonomy:    Intended Effects: Demonstrate caring and concern    Methods: Collaborate with care team member;Offer support    Interventions: Active listening; Ask guided questions;Assist someone with Advance Directives; Share words of hope and inspiration;Silent prayer    Writer report consulting with RN and daughter, Cristina Lim, brought in copy of HCPoA. Writer confirmed with patient names on HCPoA. Patient confirmed daughter, Cristina Lim and primary agent and son, Thien Ortega and successor. A copy of HCPoA is placed on chart. Writer informed RN of conversation. Daughter mentioned she will email code status to writer. Follow up as requested.     [address] [facility]   [id number]     On call  services [phone]

## 2023-04-14 NOTE — CM/SW NOTE
Department  notified of request for joe [facility] referrals started. Assigned CM/SW to follow up with pt/family on further discharge planning.      Bobbi BUCK [facility]

## 2023-04-14 NOTE — CONSULTS
Spiritual Care Visit Note    Visited With: Patient; Health care provider    Spiritual Care Taxonomy:    Intended Effects: Establish rapport and connectedness    Methods: Collaborate with care team member    Interventions: Active listening; Ask guided questions;Silent prayer    Writer report consulting with RN about alertness and decisional capacity. Writer report attempting to complete HCPoA. Patient not appropriate at this time to [facility] at this time. Follow up as requested.     [address] [facility]   [id number]     On call  services [phone]

## 2023-04-14 NOTE — CM/SW NOTE
04/14/23 1500   CM/SW Screening   Referral Source Nurse;Patient; Family;    [address] staff; Chart review;Nursing rounds   Patient's Current Mental Status at Time of Assessment Alert;Oriented   Patient's [address]   Number of Levels in Home 2   Number of Stair in Home 4-5   Patient lives with Son   Patient Status Prior to Admission   Independent with ADLs and Mobility No   Pt. requires assistance with   (ADLs)   Discharge Needs   Anticipated D/C needs   (TBD)

## 2023-04-15 LAB
ANION GAP SERPL CALC-SCNC: 9 MMOL/L (ref 0–18)
BUN BLD-MCNC: 20 MG/DL (ref 7–18)
BUN/CREAT SERPL: 23.5 (ref 10–20)
CALCIUM BLD-MCNC: 8.8 MG/DL (ref 8.5–10.1)
CHLORIDE SERPL-SCNC: 116 MMOL/L (ref 98–112)
CO2 SERPL-SCNC: 19 MMOL/L (ref 21–32)
CREAT BLD-MCNC: 0.85 MG/DL
GFR SERPLBLD BASED ON 1.73 SQ M-ARVRAT: 65 ML/MIN/1.73M2 (ref 60–?)
GLUCOSE BLD-MCNC: 73 MG/DL (ref 70–99)
INR BLD: 3.77 (ref 0.85–1.16)
MAGNESIUM SERPL-MCNC: 1.9 MG/DL (ref 1.6–2.6)
OSMOLALITY SERPL CALC.SUM OF ELEC: 299 MOSM/KG (ref 275–295)
POTASSIUM SERPL-SCNC: 4 MMOL/L (ref 3.5–5.1)
PROTHROMBIN TIME: 36.4 SECONDS (ref 11.6–14.8)
SODIUM SERPL-SCNC: 144 MMOL/L (ref 136–145)

## 2023-04-15 RX ORDER — SODIUM CHLORIDE, SODIUM LACTATE, POTASSIUM CHLORIDE, CALCIUM CHLORIDE 600; 310; 30; 20 MG/100ML; MG/100ML; MG/100ML; MG/100ML
INJECTION, SOLUTION INTRAVENOUS CONTINUOUS
Status: DISCONTINUED | OUTPATIENT
Start: 2023-04-15 | End: 2023-04-16

## 2023-04-16 LAB
ANION GAP SERPL CALC-SCNC: 6 MMOL/L (ref 0–18)
BUN BLD-MCNC: 16 MG/DL (ref 7–18)
BUN/CREAT SERPL: 18.6 (ref 10–20)
CALCIUM BLD-MCNC: 8.9 MG/DL (ref 8.5–10.1)
CHLORIDE SERPL-SCNC: 115 MMOL/L (ref 98–112)
CO2 SERPL-SCNC: 20 MMOL/L (ref 21–32)
CREAT BLD-MCNC: 0.86 MG/DL
GFR SERPLBLD BASED ON 1.73 SQ M-ARVRAT: 65 ML/MIN/1.73M2 (ref 60–?)
GLUCOSE BLD-MCNC: 77 MG/DL (ref 70–99)
INR BLD: 3.66 (ref 0.85–1.16)
OSMOLALITY SERPL CALC.SUM OF ELEC: 292 MOSM/KG (ref 275–295)
POTASSIUM SERPL-SCNC: 4.2 MMOL/L (ref 3.5–5.1)
PROTHROMBIN TIME: 35.5 SECONDS (ref 11.6–14.8)
SODIUM SERPL-SCNC: 141 MMOL/L (ref 136–145)

## 2023-04-16 NOTE — CM/SW NOTE
JESSICA received MDO for POLST. JESSICA placed POLST form on chart. MD to discuss w/ pt/family. JESSICA/KASHMIR to remain available for support and/or discharge planning.      Noman Rascon, MSW, LSW   x [phone]

## 2023-04-17 LAB
ANION GAP SERPL CALC-SCNC: 6 MMOL/L (ref 0–18)
BUN BLD-MCNC: 17 MG/DL (ref 7–18)
BUN/CREAT SERPL: 17.7 (ref 10–20)
CALCIUM BLD-MCNC: 8.9 MG/DL (ref 8.5–10.1)
CHLORIDE SERPL-SCNC: 114 MMOL/L (ref 98–112)
CO2 SERPL-SCNC: 21 MMOL/L (ref 21–32)
CREAT BLD-MCNC: 0.96 MG/DL
GFR SERPLBLD BASED ON 1.73 SQ M-ARVRAT: 57 ML/MIN/1.73M2 (ref 60–?)
GLUCOSE BLD-MCNC: 87 MG/DL (ref 70–99)
INR BLD: 3.27 (ref 0.85–1.16)
OSMOLALITY SERPL CALC.SUM OF ELEC: 293 MOSM/KG (ref 275–295)
POTASSIUM SERPL-SCNC: 4.2 MMOL/L (ref 3.5–5.1)
PROTHROMBIN TIME: 32.6 SECONDS (ref 11.6–14.8)
SODIUM SERPL-SCNC: 141 MMOL/L (ref 136–145)

## 2023-04-17 NOTE — CM/SW NOTE
List of accepting [facility] facilities emailed to patient's daughter at [email or web address] per her request. Daughter agreeable to review list overnight and provide choice tomorrow. Ins Whit Contreras will be needed prior to dc to [facility]. Plan: [facility] pending facility choice, ins auth, and medical clearance.     Ekaterina Hidalgo, MYRAN    [phone]

## 2023-04-18 LAB
ANION GAP SERPL CALC-SCNC: 5 MMOL/L (ref 0–18)
BUN BLD-MCNC: 17 MG/DL (ref 7–18)
BUN/CREAT SERPL: 18.3 (ref 10–20)
CALCIUM BLD-MCNC: 9 MG/DL (ref 8.5–10.1)
CHLORIDE SERPL-SCNC: 113 MMOL/L (ref 98–112)
CO2 SERPL-SCNC: 22 MMOL/L (ref 21–32)
CREAT BLD-MCNC: 0.93 MG/DL
GFR SERPLBLD BASED ON 1.73 SQ M-ARVRAT: 59 ML/MIN/1.73M2 (ref 60–?)
GLUCOSE BLD-MCNC: 83 MG/DL (ref 70–99)
INR BLD: 2.23 (ref 0.85–1.16)
OSMOLALITY SERPL CALC.SUM OF ELEC: 291 MOSM/KG (ref 275–295)
POTASSIUM SERPL-SCNC: 3.8 MMOL/L (ref 3.5–5.1)
PROTHROMBIN TIME: 24.3 SECONDS (ref 11.6–14.8)
SODIUM SERPL-SCNC: 140 MMOL/L (ref 136–145)

## 2023-04-18 RX ORDER — POLYETHYLENE GLYCOL 3350 17 G/17G
17 POWDER, FOR SOLUTION ORAL DAILY PRN
Status: DISCONTINUED | OUTPATIENT
Start: 2023-04-18 | End: 2023-04-22

## 2023-04-18 RX ORDER — BISACODYL 10 MG
10 SUPPOSITORY, RECTAL RECTAL
Status: DISCONTINUED | OUTPATIENT
Start: 2023-04-18 | End: 2023-04-22

## 2023-04-18 RX ORDER — WARFARIN SODIUM 2.5 MG/1
1.25 TABLET ORAL NIGHTLY
Status: DISCONTINUED | OUTPATIENT
Start: 2023-04-18 | End: 2023-04-22

## 2023-04-18 NOTE — PLAN OF CARE
Problem: Patient Centered Care  Goal: Patient preferences are identified and integrated in the patient's plan of care  Description: Interventions:  - What would you like us to know as we care for you? From home with son  - Provide timely, complete, and accurate information to patient/family  - Incorporate patient and family knowledge, values, beliefs, and cultural backgrounds into the planning and delivery of care  - Encourage patient/family to participate in care and decision-making at the level they choose  - Honor patient and family perspectives and choices  4/18/2023 0317 by Violet Lux RN  Outcome: Progressing  Note: Patients needs met during shift. 4/18/2023 0317 by Violet Lux RN  Note: Patients needs met during shift. Problem: Patient/Family Goals  Goal: Patient/Family Short Term Goal  Description: Patient's Short Term Goal: Increase intake    Interventions:   - Provide encouragement for eating and drinking and dietary consult  - See additional Care Plan goals for specific interventions  4/18/2023 0317 by Violet Lux RN  Outcome: Progressing  Note: Monitor A-fib, decrease elevated heart rate during shift. 4/18/2023 0317 by Violet Lux RN  Note: Monitor A-fib, decrease elevated heart rate during shift. Problem: SAFETY ADULT - FALL  Goal: Free from fall injury  Description: INTERVENTIONS:  - Assess pt frequently for physical needs  - Identify cognitive and physical deficits and behaviors that affect risk of falls.   - [place] fall precautions as indicated by assessment.  - Educate pt/family on patient safety including physical limitations  - Instruct pt to call for assistance with activity based on assessment  - Modify environment to reduce risk of injury  - Provide assistive devices as appropriate  - Consider OT/PT consult to assist with strengthening/mobility  - Encourage toileting schedule  4/18/2023 0317 by Violet Lux RN  Outcome: Progressing  Note: Bed alarm and I-bed activated, call light in reach. 4/18/2023 0317 by Rey Perez RN  Note: Bed alarm and I-bed activated, call light in reach. Problem: SKIN/TISSUE INTEGRITY - ADULT  Goal: Skin integrity remains intact  Description: INTERVENTIONS  - Assess and document risk factors for pressure ulcer development  - Assess and document skin integrity  - Monitor for areas of redness and/or skin breakdown  - Initiate interventions, skin care algorithm/standards of care as needed  Outcome: Progressing     Problem: RISK FOR INFECTION - ADULT  Goal: Absence of fever/infection during anticipated neutropenic period  Description: INTERVENTIONS  - Monitor WBC  - Administer growth factors as ordered  - Implement neutropenic guidelines  4/18/2023 0317 by Rey Perez RN  Outcome: Progressing  Note: Patient remains afebrile during my shift. 4/18/2023 0317 by Rey Perez RN  Note: Patient remains afebrile during my shift. Problem: CARDIOVASCULAR - ADULT  Goal: Absence of cardiac arrhythmias or at baseline  Description: INTERVENTIONS:  - Continuous cardiac monitoring, monitor vital signs, obtain 12 lead EKG if indicated  - Evaluate effectiveness of antiarrhythmic and heart rate control medications as ordered  - Initiate emergency measures for life threatening arrhythmias  - Monitor electrolytes and administer replacement therapy as ordered  4/18/2023 0317 by Rey Perez RN  Outcome: Not Progressing  Note: Patient's remote tele monitored A-fib rhythm during my shift. 4/18/2023 0317 by Rey Perez RN  Note: Patient's remote tele monitored A-fib rhythm during my shift. Patient alert and orientated to self and confused at night. Patient ambulated with 2 assist and walker from chair to bed. Patient's tele monitor reads A-fib during shift, Heart rate ranged from 107-125 during shift, doctor aware. Ordered Cardizem 30 mg every 6 hours to decrease heart rate.

## 2023-04-19 LAB
INR BLD: 1.93 (ref 0.85–1.16)
PROTHROMBIN TIME: 21.7 SECONDS (ref 11.6–14.8)

## 2023-04-19 RX ORDER — METOPROLOL SUCCINATE 25 MG/1
25 TABLET, EXTENDED RELEASE ORAL NIGHTLY
Status: DISCONTINUED | OUTPATIENT
Start: 2023-04-19 | End: 2023-04-22

## 2023-04-19 RX ORDER — DILTIAZEM HYDROCHLORIDE 120 MG/1
240 CAPSULE, EXTENDED RELEASE ORAL DAILY
Status: DISCONTINUED | OUTPATIENT
Start: 2023-04-20 | End: 2023-04-22

## 2023-04-19 NOTE — PALLIATIVE CARE NOTE
I spoke to pt's son Angelo Yancey over the phone regarding palliative care consult and set up a meeting with him tomorrow between 1230-130p for [address] discussion. Palliative care consultation to follow tomorrow.     Rob Bocanegra, ANP-BC

## 2023-04-19 NOTE — PLAN OF CARE
Problem: Patient Centered Care  Goal: Patient preferences are identified and integrated in the patient's plan of care  Description: Interventions:  - What would you like us to know as we care for you? From home with son  - Provide timely, complete, and accurate information to patient/family  - Incorporate patient and family knowledge, values, beliefs, and cultural backgrounds into the planning and delivery of care  - Encourage patient/family to participate in care and decision-making at the level they choose  - Honor patient and family perspectives and choices  Outcome: Progressing  Note: Patients needs met during my shift     Problem: Patient/Family Goals  Goal: Patient/Family Long Term Goal  Description: Patient's Long Term Goal: Go home    Interventions:  - Comply with medical plan  - See additional Care Plan goals for specific interventions  4/19/2023 0010 by Jenni Murillo RN  Outcome: Progressing  Note: Patient's appetite will improve after hospital stay  4/19/2023 0010 by Jenni Murillo RN  Note: Patient's appetite will improve after hospital stay  Goal: Patient/Family Short Term Goal  Description: Patient's Short Term Goal: Increase intake    Interventions:   - Provide encouragement for eating and drinking and dietary consult  - See additional Care Plan goals for specific interventions  Outcome: Progressing  Note: Patient will remain free from falls      Problem: SAFETY ADULT - FALL  Goal: Free from fall injury  Description: INTERVENTIONS:  - Assess pt frequently for physical needs  - Identify cognitive and physical deficits and behaviors that affect risk of falls.   - [place] fall precautions as indicated by assessment.  - Educate pt/family on patient safety including physical limitations  - Instruct pt to call for assistance with activity based on assessment  - Modify environment to reduce risk of injury  - Provide assistive devices as appropriate  - Consider OT/PT consult to assist with strengthening/mobility  - Encourage toileting schedule  Outcome: Progressing  Note: Patient remains free from falls during shift     Problem: PAIN - ADULT  Goal: Verbalizes/displays adequate comfort level or patient's stated pain goal  Description: INTERVENTIONS:  - Encourage pt to monitor pain and request assistance  - Assess pain using appropriate pain scale  - Administer analgesics based on type and severity of pain and evaluate response  - Implement non-pharmacological measures as appropriate and evaluate response  - Consider cultural and social influences on pain and pain management  - Manage/alleviate anxiety  - Utilize distraction and/or relaxation techniques  - Monitor for opioid side effects  - Notify MD/LIP if interventions unsuccessful or patient reports new pain  - Anticipate increased pain with activity and pre-medicate as appropriate  4/19/2023 0010 by Jessica Colón RN  Outcome: Progressing  Note: Patient remains free from pain during my shift  4/19/2023 0010 by Jessica Colón RN  Note: Patient remains free from pain during my shift     Problem: RISK FOR INFECTION - ADULT  Goal: Absence of fever/infection during anticipated neutropenic period  Description: INTERVENTIONS  - Monitor WBC  - Administer growth factors as ordered  - Implement neutropenic guidelines  Outcome: Progressing  Note: Patient remains afebrile during my shift     Problem: CARDIOVASCULAR - ADULT  Goal: Absence of cardiac arrhythmias or at baseline  Description: INTERVENTIONS:  - Continuous cardiac monitoring, monitor vital signs, obtain 12 lead EKG if indicated  - Evaluate effectiveness of antiarrhythmic and heart rate control medications as ordered  - Initiate emergency measures for life threatening arrhythmias  - Monitor electrolytes and administer replacement therapy as ordered  Outcome: Not Progressing  Note: Patient remains in A-fib during shift     Nurse monitored heart rate and administered Cardizem as ordered, prn every 6 hours.

## 2023-04-19 NOTE — CM/SW NOTE
Cm left voicemail [phone], unable to leave at [phone] message as vm box full to [facility] for Snf choice. Prior CM  Emailed snf list.    CM in additon sent email requesting snf choice to [place]. . notified liam needs insurance [facility]. 2pm attempted call to Susie [phone]    , unable to leave message went to busy signal.  CM  Was able to leave vm message on # [phone]    PLAN; LIAM pending choice, needs insurance prior auth for SNF. CM/SW to remain available for support and/or discharge planning.     Choco Drake RN, [facility]    Ext.  [phone]

## 2023-04-19 NOTE — CM/SW NOTE
Received call back from son-Yemi, KASHMIR informed need SNF choice to for prior insurance auth. Also, informed MD attempted to contact to provide updates. Per Darlene Olivercornelio will notify sister-Nubia. In addition, stated will be coming soon to visit pt in hospital.    PLAN; SNF, pending SNF choice. CM/SW to remain available for support and/or discharge planning.     Susan Thomas RN, [facility]    Ext.  [phone]

## 2023-04-20 PROBLEM — Z71.89 ADVANCE CARE PLANNING: Status: ACTIVE | Noted: 2023-04-20

## 2023-04-20 PROBLEM — Z71.89 GOALS OF CARE, COUNSELING/DISCUSSION: Status: ACTIVE | Noted: 2023-04-20

## 2023-04-20 PROBLEM — Z51.5 PALLIATIVE CARE BY SPECIALIST: Status: ACTIVE | Noted: 2023-04-20

## 2023-04-20 LAB
INR BLD: 1.78 (ref 0.85–1.16)
PROTHROMBIN TIME: 20.4 SECONDS (ref 11.6–14.8)

## 2023-04-20 PROCEDURE — 99222 1ST HOSP IP/OBS MODERATE 55: CPT | Performed by: REGISTERED NURSE

## 2023-04-20 RX ORDER — METOPROLOL SUCCINATE 25 MG/1
25 TABLET, EXTENDED RELEASE ORAL DAILY
Status: DISCONTINUED | OUTPATIENT
Start: 2023-04-21 | End: 2023-04-22

## 2023-04-20 NOTE — HOSPICE RN NOTE
[facility] at bedside with patient and children to discuss hospice philosophy and goals of care. Patient 89F with PMH afib, CVA 2019, residual right-sided weakness, HTN, HL, glaucoma, RENATE, urinary incontinence, FTT. Hospice philosophy and services explained and goals of care discussed. Hospice consents signed by patient's daughter Federica Edwards. DNR/Comfort POLST on chart. Booklet left with 24 hour hospice number. Discussion of care with Adama Georges MD, Nils Gomez, , and BRENNAN Strauss who are in agreement with plan of care for patient to discharge from hospital Saturday 3:30PM to go home with hospice care. Equipment and medications to be delivered tomorrow- patient's family unavailable due to work schedules to make room in home for equipment and prepare for patient to come home until tomorrow later evening. Please reach out with additional questions and concerns.      Timmy Andersno, [facility]  [phone]  Office: [phone]

## 2023-04-22 VITALS
OXYGEN SATURATION: 100 % | TEMPERATURE: 98 F | RESPIRATION RATE: 18 BRPM | SYSTOLIC BLOOD PRESSURE: 127 MMHG | WEIGHT: 190.13 LBS | BODY MASS INDEX: 33 KG/M2 | DIASTOLIC BLOOD PRESSURE: 72 MMHG | HEART RATE: 90 BPM

## 2023-04-27 ENCOUNTER — TELEPHONE (OUTPATIENT)
Dept: SURGERY | Facility: CLINIC | Age: 88
End: 2023-04-27

## 2023-04-27 NOTE — TELEPHONE ENCOUNTER
Pt's daughter calling to cancel upcoming appt as pt is now in hospice at her home. Patient has stopped eating. Daughter wanted to express her thanks to Dr Felicitas Arriola.

## 2023-05-06 ENCOUNTER — APPOINTMENT (OUTPATIENT)
Dept: CT IMAGING | Facility: HOSPITAL | Age: 88
End: 2023-05-06
Attending: EMERGENCY MEDICINE
Payer: COMMERCIAL

## 2023-05-06 ENCOUNTER — HOSPITAL ENCOUNTER (EMERGENCY)
Facility: HOSPITAL | Age: 88
Discharge: HOME | End: 2023-05-06
Attending: EMERGENCY MEDICINE
Payer: COMMERCIAL

## 2023-05-06 VITALS
SYSTOLIC BLOOD PRESSURE: 133 MMHG | TEMPERATURE: 97 F | OXYGEN SATURATION: 98 % | DIASTOLIC BLOOD PRESSURE: 78 MMHG | HEART RATE: 67 BPM | RESPIRATION RATE: 20 BRPM

## 2023-05-06 DIAGNOSIS — R09.89 SUSPECTED STROKE PATIENT LAST KNOWN TO BE WELL MORE THAN 2 HOURS AGO: Primary | ICD-10-CM

## 2023-05-06 LAB
GLUCOSE BLDC GLUCOMTR-MCNC: 83 MG/DL (ref 70–99)
Q-T INTERVAL: 336 MS
QRS DURATION: 80 MS
QTC CALCULATION (BEZET): 448 MS
R AXIS: 8 DEGREES
T AXIS: 269 DEGREES
VENTRICULAR RATE: 107 BPM

## 2023-05-06 PROCEDURE — 93010 ELECTROCARDIOGRAM REPORT: CPT

## 2023-05-06 PROCEDURE — 99285 EMERGENCY DEPT VISIT HI MDM: CPT

## 2023-05-06 PROCEDURE — 96361 HYDRATE IV INFUSION ADD-ON: CPT

## 2023-05-06 PROCEDURE — 93005 ELECTROCARDIOGRAM TRACING: CPT

## 2023-05-06 PROCEDURE — 96360 HYDRATION IV INFUSION INIT: CPT

## 2023-05-06 PROCEDURE — 82962 GLUCOSE BLOOD TEST: CPT

## 2023-05-06 PROCEDURE — 70450 CT HEAD/BRAIN W/O DYE: CPT | Performed by: EMERGENCY MEDICINE

## 2023-05-06 NOTE — ED QUICK NOTES
Confirmed with , pt is in hospice care. They have a RN coming to ER for family.   Family at bs, speaking to CL[place]ON

## 2023-05-06 NOTE — ED QUICK NOTES
Discussed at length preferred treatment and plan of care for patient. Family wishes to take pt home, remain in hospice. ermd aware, hospice liason called to notify of family wishes. Will plan to keep pt comfortable at this time. Will cont. To monitor.

## 2023-05-06 NOTE — ED QUICK NOTES
Hospice requesting hoang be placed prior to discharge and to notify them at [phone] when superior is here for transport home. They will send an RN to the [facility] for family support.

## 2023-05-06 NOTE — ED QUICK NOTES
Family requesting fluids for patient as she has not been eating/drinking the past few days. D/w deond who is ok with plan while we wait for hospice.

## 2025-04-15 NOTE — ED QUICK NOTES
Keep wound clean and dry  Monitor site and follow up with any: increased redness, swelling, pus-like drainage, red-streaking, or if you develop fever   Rounding completed    No complaints at this time  Awaiting lab/imaging results  Elimination assistance offered  No additional needs at this time  Call light within reach, will update patient with more information  Will continue to monitor

## (undated) NOTE — IP AVS SNAPSHOT
Patient Demographics     Address  [address] Phone  [phone] [facility])  [phone] (Work)  [phone] [facility])      Emergency Contact(s)     Name Relation Home Work Mobile    Yemi Forte [phone] lisinopril 40 MG Tabs  Commonly known as:  PRINIVIL,ZESTRIL  Next dose due:  2/19/19 morning      Take 1 tablet (40 mg total) by mouth daily.    Yandy Her MD         Metoprolol Succinate ER 50 MG Tb24  Commonly known as:  TOPROL XL  Next dose d Most Recent Value   Vitals  122/58 Filed at 02/18/2019 1519   Pulse  62 Filed at 02/18/2019 1519   Resp  20 Filed at 02/18/2019 1519   Temp  97.9 °F (36.6 °C) Filed at 02/18/2019 1519   SpO2  100 % Filed at 02/18/2019 1519      Patient's Most Recent Weigh CC:[JH.1] Patient presents with:  Stroke (neurologic)[JH.2]       PCP:[JH.1] Geno Weinberg MD[JH.2]      Assessment and Plan[JH.1]     Jordana Forte[JH.2] is a[JH.3 80year old[JH.2] female with PMH sig for[JH.1] afib with prior CVA, dementia, anxie Jordana Forte[JH.2] is a[JH.3 80year old[JH.2] female with PMH sig for[JH.1] afib with prior CVA, dementia, anxiety, glaucoma, HTN, urge incontinence who presented left facial droop, left leg weakness and confusion.   Symptoms improved but then was unable Soc Hx[JH.1]  Social History    Tobacco Use      Smoking status: Never Smoker      Smokeless tobacco: Never Used    Alcohol use: No      Alcohol/week: 0.0 oz[JH.2]       Fam Hx[JH.1]  Family History   Problem Relation Age of Onset   • Cancer Father obtained without contrast material.  Automated exposure control for dose reduction was used. FINDINGS:  CSF SPACES: Ventricles, cisterns, and sulci are appropriate for age. No hydrocephalus, subarachnoid hemorrhage, or mass. No midline shift.   CEREBRUM CONCLUSION:  1. Mild cardiomegaly and mildly prominent pulmonary vessels, but no brandon pulmonary edema. No acute airspace disease.      Dictated by (CST): Jose Gurrola MD on 2/13/2019 at 19:51     Approved by (CST): Jose Gurrola MD on 2/13/2019 at 19:52 admitted to the medical floor and psych consult requested considering underlying psych and somatization.       Reviewing her record and reviewing today MRI indicate multiple foci of abnormal diffusion restriction in the left and right cerebellar hemispheres • COAG (chronic open-angle glaucoma) 3/4/2017   • Dependent edema 4/25/2014   • Disorder of thyroid     enlarged thyroid   • Dyslipidemia 3/4/2017   • Essential hypertension    • Glaucoma    • High blood pressure    • HYPERTENSION    • Incontinence     at Oral Daily PRN   bisacodyl (DULCOLAX) rectal suppository 10 mg 10 mg Rectal Daily PRN   FLEET ENEMA (FLEET) 7-19 GM/118ML enema 133 mL 1 enema Rectal Once PRN   ondansetron HCl (ZOFRAN) injection 4 mg 4 mg Intravenous Q6H PRN   Metoclopramide HCl (REGLAN) CONCLUSION:  1. No intracranial hemorrhage, large focal infarct or mass. There is a small chronic lacunar infarct in the left cerebellum inferiorly.  There is mild chronic appearing deep white matter ischemic change in the periventricular white matter bilat Dale Alegria MD on 2/13/2019 at 19:52            Vital Signs:     Blood pressure 152/67, pulse 89, temperature 99 °F (37.2 °C), temperature source Oral, resp. rate 18, height 64\", weight 237 lb 9.6 oz, SpO2 100 %, not currently breastfeeding.     Mental Prothrombin Time (PT)      Potassium      Prothrombin Time (PT)      Basic Metabolic Panel (8)      CBC With Differential With Platelet      Prothrombin Time (PT)      Meds This Visit:  Requested Prescriptions      No prescriptions requested or ordered efficiency. Pt seated in recliner at end of session, alarm on. RN aware of session outcome. Cont POC.     The patient's Approx Degree of Impairment: 50.57% has been calculated based on documentation in the [facility] '6 clicks' Inpatient Basic Mobility Short For Gait Assistance: Contact guard assist  Distance (ft): 20  Assistive Device: Rolling walker  Pattern: R Steppage;L Steppage;R Foot flat;L Foot flat  Stoop/Curb Assistance: Not tested       Patient End of Session: Up in chair;Needs met;Call light within reac Pt cleared for PT treatment session by BRENNAN Chang, coordinated session with OT. Pt agreeable. Received seated in recliner. Sit>stand transfer with RW CGA, pt required verbal cues to scoot forward in chair and for UE placement for task efficiency.  Gait train Weight Bearing Restriction: None       PAIN ASSESSMENT   Ratin  Location: /  Management Techniques: (/)    BALANCE                                                                                                                       Static Sitting: Goo assistance level: CGwith walker - rolling      Goal #2  Current Status  RW CGA   Goal #3 Patient is able to ambulate 100 feet with assist device: walker - rolling at assistance level: minimum assistance   Goal #3   Current Status  15' x2 RW CGA   Goal #4 P The patient's Approx Degree of Impairment: 50.11% has been calculated based on documentation in the [facility] '6 clicks' Inpatient Daily Activity Short Form. Research supports that patients with this level of impairment may benefit from sub-acute rehab.  This FUNCTIONAL ADL ASSESSMENT  Lower Extremity Dressing: max a     Education Provided: AE training for LB dressing - will continue to need training   Patient End of Session: Up in chair; With [facility] staff;Needs met;Call light within reach;RN aware of session/findin up to min A ~15ft approach with RW; pt with poor posture but able to manage toilet hygiene with CGA, requires cues for proper hand placement. Pt returns to BS chair[ST. 3] with ~5ft approach.  At this time pt is requiring up to max a for LB dressing, min A f -   Putting on and taking off regular upper body clothing?: A Little  -   Taking care of personal grooming such as brushing teeth?: A Little  -   Eating meals?: None    AM-PAC Score:  Score: 17  Approx Degree of Impairment: 50.11%  Standardized Score (AM-P Evaluation Date: 02/15/19    Reason for Referral: Stroke protocol    ASSESSMENT & PLAN   ASSESSMENT & IMPRESSION  RN reports pt with increased response time; however, communicating effectively. RN states pt tolerates diet well with no overt CSA.  Family in tiny focus of diffusion restriction in the posterior aspect the right parietal lobe most likely representing an additional site of acute/recent infarct.   2. There are mild microvascular white matter ischemic changes, likely related to long-standing hyperte 3/29/2019 9:30 AM Bertin Ferrell MD [facility] [address] [facility] 10    7/19/2019 10:00 AM Buster Weinberg MD [facility] MED/SUZY - [phone]

## (undated) NOTE — IP AVS SNAPSHOT
[facility]            (For Outpatient Use Only) Initial Admit Date: 2022   Inpt/Obs Admit Date: Inpt: N/A / Obs: 22   Discharge Date:    Lo Longo:  [de-identified]   MRN: [de-identified]   CSN: [id number]   CEID: [id number]        ENCOUNTER  Patient Class: Observation Admitting Provider: Sivakumar Tobias MD Unit: [address] [place] [address]//SE   Hospital Service: Ortho/Spine Attending Provider: Sivakumar Tobias MD   Bed: 406-A   Visit Type:   Referring Physician: No ref. provider found Billing Flag:    Admit Diagnosis: Closed Freddie fracture, initial encounter [facility]) Brian Alarcon      PATIENT  Legal Name:   Tracy Prescott   Legal Sex: Female  Gender ID:              [address] [place] [address],[address] Name:    PCP:  Todd Mei MD Home: [phone]   Address:  [place] [address] : 1934 (88 yrs) Mobile: [phone]         City/State/Zip: [place] [address] Marital: Single Language: [address] [place] [address] Drive: Merlin Diamonds SSN4: [id number] Adventism: None     Race: Black Or  Ethnicity: Non  Or  O*   EMERGENCY CONTACT   Name Relationship Legal Guardian? Home Phone Work Phone Mobile Phone   1Sandra Nubia Hernandez  2.  Reanna,Yemi Daughter  Son    [phone]     GUARANTOR  Guarantor: Kristina Leal : 1934 Home Phone: [phone]   Address: [place] [address]  Sex: Female Work Phone:    City/State/Zip: [place] [address]   Rel. to Patient: Self Guarantor ID: [id number]   [address]   Employer:  Status: RETIRED     COVERAGE  PRIMARY INSURANCE   Payor: [address] [place] [address] Street: Joselito Nice POS   Group Number: [id number] Insurance Type: [id number] Name: Richmond Soto : 1934   Subscriber ID: [id number] Pt Rel to Subscriber: Self   SECONDARY INSURANCE   Payor:  Plan:    Group Number:  Insurance Type:    Subscriber Name:  Subscriber :    Subscriber ID:  Pt Rel to Subscriber:    TERTIARY INSURANCE   Payor:  Plan:    Group Number:  Insurance Type: Subscriber Name:  Teo Kumari :    Subscriber ID:  Pt Rel to Subscriber:    Hospital Account Financial Class: Managed Care    January 3, 2023

## (undated) NOTE — IP AVS SNAPSHOT
[facility]            (For Outpatient Use Only) Initial Admit Date: 2/13/2019   Inpt/Obs Admit Date: Inpt: 2/14/19 / Obs: 02/13/19   Discharge Date:    Martin Antoni:  [de-identified]   MRN: [de-identified]   CSN: [id number]        ENCOUNTER  Patient February 18, 2019